# Patient Record
Sex: FEMALE | Race: WHITE | NOT HISPANIC OR LATINO | Employment: OTHER | URBAN - METROPOLITAN AREA
[De-identification: names, ages, dates, MRNs, and addresses within clinical notes are randomized per-mention and may not be internally consistent; named-entity substitution may affect disease eponyms.]

---

## 2017-01-20 ENCOUNTER — TELEPHONE (OUTPATIENT)
Dept: GASTROENTEROLOGY | Facility: CLINIC | Age: 72
End: 2017-01-20

## 2017-01-20 NOTE — TELEPHONE ENCOUNTER
S/w pt to schedule colon ordered by Dr. Riojas. Pt declined she stated she can not do that right now she does not have time. She will call back when ready.

## 2017-02-07 RX ORDER — METOPROLOL TARTRATE 50 MG/1
TABLET ORAL
Qty: 90 TABLET | Refills: 3 | Status: SHIPPED | OUTPATIENT
Start: 2017-02-07 | End: 2018-04-03 | Stop reason: SDUPTHER

## 2017-05-30 ENCOUNTER — PATIENT OUTREACH (OUTPATIENT)
Dept: ADMINISTRATIVE | Facility: HOSPITAL | Age: 72
End: 2017-05-30

## 2017-05-30 NOTE — LETTER
May 30, 2017    Sofiya aZrate  [address] [address]             [facility]  [address]  [facility] [address]  Phone: [phone] Dear Ms. Zarate:    [facility] is committed to your overall health.  To help you get the most out of each of your visits, we will review your information to make sure you are up to date on all of your recommended tests and/or procedures.      Dr. Riojas        has found that you may be due for:    One-time Hepatitis C Screening lab test(a viral condition that can harm the liver)  colonoscopy    If you have had any of the above done at another facility, please bring the records or information with you so that your record at [facility] will be complete.     If you are currently taking medication, please bring it with you to your appointment for review.    If you have any questions or concerns, please don't hesitate to call.    Sincerely,      Courtney Cassidy  Clinical Care Coordinator  [facility] [address] [place] [address]  Phone: [phone]   Fax: [phone]

## 2017-06-27 RX ORDER — LEVOTHYROXINE SODIUM 25 UG/1
TABLET ORAL
Qty: 90 TABLET | Refills: 3 | Status: SHIPPED | OUTPATIENT
Start: 2017-06-27 | End: 2018-06-05 | Stop reason: SDUPTHER

## 2017-08-25 ENCOUNTER — PATIENT OUTREACH (OUTPATIENT)
Dept: ADMINISTRATIVE | Facility: HOSPITAL | Age: 72
End: 2017-08-25

## 2017-08-25 NOTE — LETTER
August 25, 2017    Sofiya Zarate  [address] [address] [address]             [facility]  [address]  [facility] [address]  Phone: [phone] Dear Ms. Zarate:    [facility] is committed to your overall health and would like to ensure that you are up to date on your recommended health testing.   Dr. Riojas has found that you may be due for the following:    One-time Hepatitis C Screening lab test(a viral condition that can harm the liver)  colonoscopy    If you have had any of the above done at another facility, please let us know by calling or faxing to the numbers below so that your medical record can be updated. If you have a copy of these records, please fax them to the fax number below.  If not, please call [phone] so that we can get the necessary information to obtain copies from that facility.     Otherwise, please schedule these appointments at your earliest convenience by calling [phone] or going to [email or web address].        If you have any questions or concerns, please don't hesitate to call.    Sincerely,    Courtney Cassidy  Clinical Care Coordinator  [facility]  [address] [place] [address]  Phone: [phone]   Fax: [phone]

## 2017-09-08 ENCOUNTER — OFFICE VISIT (OUTPATIENT)
Dept: FAMILY MEDICINE | Facility: CLINIC | Age: 72
End: 2017-09-08
Payer: MEDICARE

## 2017-09-08 VITALS
BODY MASS INDEX: 34.06 KG/M2 | WEIGHT: 199.5 LBS | HEIGHT: 64 IN | SYSTOLIC BLOOD PRESSURE: 134 MMHG | HEART RATE: 60 BPM | DIASTOLIC BLOOD PRESSURE: 57 MMHG

## 2017-09-08 DIAGNOSIS — I77.9 BILATERAL CAROTID ARTERY DISEASE: ICD-10-CM

## 2017-09-08 DIAGNOSIS — M17.10 ARTHRITIS OF KNEE: Chronic | ICD-10-CM

## 2017-09-08 DIAGNOSIS — K21.9 GASTROESOPHAGEAL REFLUX DISEASE WITHOUT ESOPHAGITIS: Chronic | ICD-10-CM

## 2017-09-08 DIAGNOSIS — I25.10 CORONARY ARTERY DISEASE INVOLVING NATIVE CORONARY ARTERY OF NATIVE HEART WITHOUT ANGINA PECTORIS: Chronic | ICD-10-CM

## 2017-09-08 DIAGNOSIS — E66.9 OBESITY (BMI 30.0-34.9): ICD-10-CM

## 2017-09-08 DIAGNOSIS — E03.4 HYPOTHYROIDISM DUE TO ACQUIRED ATROPHY OF THYROID: Chronic | ICD-10-CM

## 2017-09-08 DIAGNOSIS — Z12.11 ENCOUNTER FOR SCREENING FOR MALIGNANT NEOPLASM OF COLON: ICD-10-CM

## 2017-09-08 DIAGNOSIS — Z00.00 ENCOUNTER FOR PREVENTIVE HEALTH EXAMINATION: Primary | ICD-10-CM

## 2017-09-08 DIAGNOSIS — I70.0 AORTIC ATHEROSCLEROSIS: ICD-10-CM

## 2017-09-08 DIAGNOSIS — I10 ESSENTIAL HYPERTENSION: Chronic | ICD-10-CM

## 2017-09-08 PROCEDURE — 99499 UNLISTED E&M SERVICE: CPT | Mod: S$GLB,,, | Performed by: NURSE PRACTITIONER

## 2017-09-08 PROCEDURE — G0439 PPPS, SUBSEQ VISIT: HCPCS | Mod: S$GLB,,, | Performed by: NURSE PRACTITIONER

## 2017-09-08 PROCEDURE — 99999 PR PBB SHADOW E&M-EST. PATIENT-LVL IV: CPT | Mod: PBBFAC,,, | Performed by: NURSE PRACTITIONER

## 2017-09-08 RX ORDER — CHOLECALCIFEROL (VITAMIN D3) 125 MCG
1 CAPSULE ORAL 2 TIMES DAILY PRN
COMMUNITY
Start: 2017-09-08

## 2017-09-08 NOTE — PATIENT INSTRUCTIONS
Counseling and Referral of Other Preventative  (Italic type indicates deductible and co-insurance are waived)    Patient Name: Sofiya Zarate  Today's Date: 9/8/2017      SERVICE LIMITATIONS RECOMMENDATION    Vaccines    · Pneumococcal (once after 65)    · Influenza (annually)    · Hepatitis B (if medium/high risk)    · Prevnar 13      Hepatitis B medium/high risk factors:       - End-stage renal disease       - Hemophiliacs who received Factor VII or         IX concentrates       - Clients of institutions for the mentally             retarded       - Persons who live in the same house as          a HepB carrier       - Homosexual men       - Illicit injectable drug abusers     Pneumococcal: Scheduled - see appointments     Influenza: N/A     Hepatitis B: N/A     Prevnar 13: Done, no repeat necessary    Mammogram (biennial age 50-74)  Annually (age 40 or over)  Last done 2016, recommend to repeat every 1  years    Pap (up to age 70 and after 70 if unknown history or abnormal study last 10 years)    N/A     The USPSTF recommends against screening for cervical cancer in women older than age 65 years who have had adequate prior screening and are not otherwise at high risk for cervical cancer.      Colorectal cancer screening (to age 75)    · Fecal occult blood test (annual)  · Flexible sigmoidoscopy (5y)  · Screening colonoscopy (10y)  · Barium enema   N/A    Diabetes self-management training (no USPSTF recommendations)  Requires referral by treating physician for patient with diabetes or renal disease. 10 hours of initial DSMT sessions of no less than 30 minutes each in a continuous 12-month period. 2 hours of follow-up DSMT in subsequent years.  N/A    Bone mass measurements (age 65 & older, biennial)  Requires diagnosis related to osteoporosis or estrogen deficiency. Biennial benefit unless patient has history of long-term glucocorticoid  Last done 2016, recommend to repeat every 3  years    Glaucoma screening (no  USPSTF recommendation)  Diabetes mellitus, family history   , age 50 or over    American, age 65 or over  Recommend follow up with eye care professional regularly    Medical nutrition therapy for diabetes or renal disease (no recommended schedule)  Requires referral by treating physician for patient with diabetes or renal disease or kidney transplant within the past 3 years.  Can be provided in same year as diabetes self-management training (DSMT), and CMS recommends medical nutrition therapy take place after DSMT. Up to 3 hours for initial year and 2 hours in subsequent years.  N/A    Cardiovascular screening blood tests (every 5 years)  · Fasting lipid panel  Order as a panel if possible  Last done 2016, recommend to repeat every 1  years    Diabetes screening tests (at least every 3 years, Medicare covers annually or at 6-month intervals for prediabetic patients)  · Fasting blood sugar (FBS) or glucose tolerance test (GTT)  Patient must be diagnosed with one of the following:       - Hypertension       - Dyslipidemia       - Obesity (BMI 30kg/m2)       - Previous elevated impaired FBS or GTT       ... or any two of the following:       - Overweight (BMI 25 but <30)       - Family history of diabetes       - Age 65 or older       - History of gestational diabetes or birth of baby weighing more than 9 pounds  Last done 2016, recommend to repeat every 3  years    HIV screening (annually for increased risk patients)  · HIV-1 and HIV-2 by EIA, or YADIRA, rapid antibody test or oral mucosa transudate  Patients must be at increased risk for HIV infection per USPSTF guidelines or pregnant. Tests covered annually for patient at increased risk or as requested by the patient. Pregnant patients may receive up to 3 tests during pregnancy.  Risks discussed, screening is not recommended    Smoking cessation counseling (up to 8 sessions per year)  Patients must be asymptomatic of tobacco-related conditions  to receive as a preventative service.  Non-smoker    Subsequent annual wellness visit  At least 12 months since last AWV  Return in one year     The following information is provided to all patients.  This information is to help you find resources for any of the problems found today that may be affecting your health:                Living healthy guide: www.[facility].[place].[facility]      Understanding Diabetes: www.diabetes.org      Eating healthy: www.cdc.gov/healthyweight      CDC home safety checklist: www.cdc.gov/steadi/patient.html      Agency on Aging: www.goea.[place].[facility]      Alcoholics anonymous (AA): www.aa.org      Physical Activity: www.katheryn.nih.gov/wi7mcvt      Tobacco use: www.quitwithusla.org

## 2017-09-14 PROBLEM — I70.0 AORTIC ATHEROSCLEROSIS: Status: ACTIVE | Noted: 2017-09-14

## 2017-09-14 NOTE — PROGRESS NOTES
"Sofiya Zarate presented for a  Medicare AWV and comprehensive Health Risk Assessment today. The following components were reviewed and updated:    · Medical history  · Family History  · Social history  · Allergies and Current Medications  · Health Risk Assessment  · Health Maintenance  · Care Team     ** See Completed Assessments for Annual Wellness Visit within the encounter summary.**       The following assessments were completed:  · Living Situation  · CAGE  · Depression Screening  · Timed Get Up and Go  · Whisper Test  · Cognitive Function Screening  · Nutrition Screening  · ADL Screening  · PAQ Screening    Vitals:    09/08/17 1255   BP: (!) 134/57   BP Location: Left arm   Patient Position: Sitting   BP Method: Medium (Automatic)   Pulse: 60   Weight: 90.5 kg (199 lb 8.3 oz)   Height: 5' 4" (1.626 m)     Body mass index is 34.25 kg/m².  Physical Exam   Constitutional: She is oriented to person, place, and time. She appears well-developed and well-nourished. No distress.   HENT:   Head: Normocephalic and atraumatic.   Eyes: No scleral icterus.   Neck: Carotid bruit is not present.   Cardiovascular: Normal rate, regular rhythm and normal heart sounds.  Exam reveals no gallop and no friction rub.    No murmur heard.  No carotid bruits appreciated     Pulmonary/Chest: Effort normal and breath sounds normal. No respiratory distress. She has no wheezes.   Musculoskeletal: She exhibits no edema.   Neurological: She is alert and oriented to person, place, and time.   Skin: Skin is warm and dry. No erythema.   Psychiatric: She has a normal mood and affect. Her behavior is normal. Judgment and thought content normal.   Vitals reviewed.        Diagnoses and health risks identified today and associated recommendations/orders:    1. Encounter for preventive health examination  Reviewed health maintenance and provided recommendations  Written rx for ppsv23 provided      2. Bilateral carotid artery disease  Continue to " monitor with carotid us  Taking statin  Followed by [facility].       3. Coronary artery disease involving native coronary artery of native heart without angina pectoris  No cp  Taking b-blocker and asa  Followed by [facility].       4. Essential hypertension  Stable.   Controlled on current medications.  Followed by [facility].       5. Arthritis of knee  Stable.     Followed by Felton Riojas MD .       6. Hypothyroidism due to acquired atrophy of thyroid  Stable.   Taking levothyroxine  Followed by Felton Riojas MD .       7. Obesity (BMI 30.0-34.9)  Stable.   Encourage healthy food choices  Followed by Felton Riojas MD .       8. Gastroesophageal reflux disease without esophagitis  Stable.     Followed by Felton Riojas MD .       9. Encounter for screening for malignant neoplasm of colon  [employer] was given to patient on 9/14/2017 11:04 AM        - Fecal Immunochemical Test (iFOBT); Future    10.  Aortic Atherosclerosis  Taking statin  Continue to monitor lipids  Followed by [facility].   CT abd 6/29/06    Provided Sofiya with a 5-10 year written screening schedule and personal prevention plan. Recommendations were developed using the USPSTF age appropriate recommendations. Education, counseling, and referrals were provided as needed. After Visit Summary printed and given to patient which includes a list of additional screenings\tests needed.    Return in about 1 year (around 9/8/2018).    Olga Ly NP

## 2017-09-18 ENCOUNTER — HOSPITAL ENCOUNTER (OUTPATIENT)
Dept: RADIOLOGY | Facility: HOSPITAL | Age: 72
Discharge: HOME | End: 2017-09-18
Attending: INTERNAL MEDICINE
Payer: MEDICARE

## 2017-09-18 DIAGNOSIS — I77.89 OTHER SPECIFIED DISORDERS OF ARTERIES AND ARTERIOLES: ICD-10-CM

## 2017-09-18 DIAGNOSIS — I77.9 CAROTID ARTERY DISEASE, UNSPECIFIED LATERALITY: ICD-10-CM

## 2017-09-18 DIAGNOSIS — I77.9 CAROTID ARTERY DISORDER: ICD-10-CM

## 2017-09-18 PROCEDURE — 93880 EXTRACRANIAL BILAT STUDY: CPT | Mod: 26,,, | Performed by: RADIOLOGY

## 2017-09-18 PROCEDURE — 93880 EXTRACRANIAL BILAT STUDY: CPT | Mod: TC,PO

## 2017-11-08 RX ORDER — ROSUVASTATIN CALCIUM 40 MG/1
TABLET, COATED ORAL
Qty: 90 TABLET | Refills: 0 | Status: SHIPPED | OUTPATIENT
Start: 2017-11-08 | End: 2018-05-07

## 2017-11-29 ENCOUNTER — OFFICE VISIT (OUTPATIENT)
Dept: FAMILY MEDICINE | Facility: CLINIC | Age: 72
End: 2017-11-29
Payer: MEDICARE

## 2017-11-29 ENCOUNTER — TELEPHONE (OUTPATIENT)
Dept: FAMILY MEDICINE | Facility: CLINIC | Age: 72
End: 2017-11-29

## 2017-11-29 ENCOUNTER — HOSPITAL ENCOUNTER (OUTPATIENT)
Dept: RADIOLOGY | Facility: HOSPITAL | Age: 72
Discharge: HOME | End: 2017-11-29
Attending: NURSE PRACTITIONER
Payer: MEDICARE

## 2017-11-29 VITALS
SYSTOLIC BLOOD PRESSURE: 120 MMHG | RESPIRATION RATE: 14 BRPM | HEART RATE: 63 BPM | OXYGEN SATURATION: 95 % | DIASTOLIC BLOOD PRESSURE: 84 MMHG | WEIGHT: 196.88 LBS | HEIGHT: 64 IN | BODY MASS INDEX: 33.61 KG/M2 | TEMPERATURE: 99 F

## 2017-11-29 DIAGNOSIS — M79.605 PAIN OF LEFT LOWER EXTREMITY: Primary | ICD-10-CM

## 2017-11-29 DIAGNOSIS — M79.605 PAIN OF LEFT LOWER EXTREMITY: ICD-10-CM

## 2017-11-29 DIAGNOSIS — M79.605 LEFT LEG PAIN: Primary | ICD-10-CM

## 2017-11-29 PROCEDURE — 73590 X-RAY EXAM OF LOWER LEG: CPT | Mod: TC,PO,LT

## 2017-11-29 PROCEDURE — 99999 PR PBB SHADOW E&M-EST. PATIENT-LVL V: CPT | Mod: PBBFAC,,, | Performed by: NURSE PRACTITIONER

## 2017-11-29 PROCEDURE — 73590 X-RAY EXAM OF LOWER LEG: CPT | Mod: 26,LT,, | Performed by: RADIOLOGY

## 2017-11-29 PROCEDURE — 99213 OFFICE O/P EST LOW 20 MIN: CPT | Mod: S$GLB,,, | Performed by: NURSE PRACTITIONER

## 2017-11-29 RX ORDER — GABAPENTIN 100 MG/1
100 CAPSULE ORAL NIGHTLY
Qty: 30 CAPSULE | Refills: 0 | Status: SHIPPED | OUTPATIENT
Start: 2017-11-29 | End: 2018-03-07

## 2017-11-29 NOTE — PROGRESS NOTES
Subjective:       Patient ID: Sofiya Zarate is a 72 y.o. female.    Chief Complaint: Hip Pain; Leg Pain; and Back Pain    Pt here for c/o pain in lower left leg for a couple of weeks now, she reports the pain has made her alter her gait which is causing her hip and back to hurt as well. Pt reports, she doesn't recall injuring her left lower leg. It hurts worse laying down and hurts when she walks. Dealing with it for 2 weeks, using ibuprofen, heat, and rubs.  Began taking gabapentin one pill at night only,this seems to help, but she ran out of gabapentin. Denies fever, warmth to site, or injury. Hx of knee replacement 13 yrs ago of left leg.      Leg Pain    The incident occurred more than 1 week ago. The incident occurred at home. There was no injury mechanism. The pain is present in the left leg. The quality of the pain is described as stabbing. The pain is at a severity of 10/10 (walking in and 8 or 9 sitting.). The pain has been constant since onset. Associated symptoms include an inability to bear weight. Pertinent negatives include no loss of sensation, numbness or tingling. She reports no foreign bodies present. The symptoms are aggravated by movement and weight bearing. She has tried NSAIDs, rest, heat and elevation (gabapentin) for the symptoms. The treatment provided mild relief.     Vitals:    11/29/17 1128   BP: 120/84   Pulse: 63   Resp: 14   Temp: 98.7 °F (37.1 °C)     Review of Systems   Constitutional: Negative for chills, diaphoresis and fever.   Respiratory: Negative for chest tightness and shortness of breath.    Cardiovascular: Negative for chest pain.   Musculoskeletal:        Left leg pain that makes her walk differently and now hurting her hip and back area.    Neurological: Negative for tingling and numbness.       Objective:      Physical Exam   Constitutional: She is oriented to person, place, and time. Vital signs are normal. She appears well-developed and well-nourished. She is cooperative.    HENT:   Head: Normocephalic and atraumatic.   Right Ear: Hearing normal.   Left Ear: Hearing normal.   Mouth/Throat: Mucous membranes are normal.   Eyes: Conjunctivae, EOM and lids are normal.   Neck: Normal range of motion. Neck supple.   Cardiovascular: Normal rate, regular rhythm and normal heart sounds.    Pulses:       Posterior tibial pulses are 2+ on the left side.   Pulmonary/Chest: Effort normal and breath sounds normal. No respiratory distress.   Musculoskeletal:        Left lower leg: She exhibits tenderness. She exhibits no swelling, no deformity and no laceration.        Legs:  Negative homans sign to left leg. No edema, erythema, or warmth to touch. No visible injury or abrasion noted. Skin intact.    Neurological: She is alert and oriented to person, place, and time.   Skin: Skin is warm and dry.   Psychiatric: She has a normal mood and affect. Her speech is normal and behavior is normal. Judgment and thought content normal. Cognition and memory are normal.   Nursing note and vitals reviewed.      Assessment & Plan:       Pain of left lower extremity  -     gabapentin (NEURONTIN) 100 MG capsule; Take 1 capsule (100 mg total) by mouth every evening.  Dispense: 30 capsule; Refill: 0  -     X-Ray Tibia Fibula 2 View Left; Future; Expected date: 11/29/2017    Will call pt with results.       Return if symptoms worsen or fail to improve.

## 2017-11-29 NOTE — TELEPHONE ENCOUNTER
"Spoke with pt on telephone. Notified of left leg xray result: "A total knee arthroplasty on the left is noted. Osseous demineralization is suspected diffusely. Achilles and plantar calcaneal spurring is noted. Talonavicular joint space loss is noted on the left."    Reviewed xray result with Dr Cason, recommended ultrasound.   Pt verbalized understanding.     "

## 2017-11-30 ENCOUNTER — TELEPHONE (OUTPATIENT)
Dept: FAMILY MEDICINE | Facility: CLINIC | Age: 72
End: 2017-11-30

## 2017-11-30 ENCOUNTER — HOSPITAL ENCOUNTER (OUTPATIENT)
Dept: RADIOLOGY | Facility: HOSPITAL | Age: 72
Discharge: HOME | End: 2017-11-30
Attending: NURSE PRACTITIONER
Payer: MEDICARE

## 2017-11-30 DIAGNOSIS — M79.605 LEFT LEG PAIN: ICD-10-CM

## 2017-11-30 DIAGNOSIS — M77.32 CALCANEAL SPUR OF LEFT FOOT: Primary | ICD-10-CM

## 2017-11-30 PROCEDURE — 93971 EXTREMITY STUDY: CPT | Mod: 26,,, | Performed by: RADIOLOGY

## 2017-11-30 PROCEDURE — 93971 EXTREMITY STUDY: CPT | Mod: TC,PO

## 2017-11-30 NOTE — TELEPHONE ENCOUNTER
"Spoke with pt on the telephone.   Notified of normal US of left leg, no blood clot seen.   Pt aware of xray result :"A total knee arthroplasty on the left is noted. Osseous demineralization is suspected diffusely. Achilles and plantar calcaneal spurring is noted. Talonavicular joint space loss is noted on the left".    Will send referral to [facility] per patient's request.   "

## 2017-12-04 ENCOUNTER — TELEPHONE (OUTPATIENT)
Dept: FAMILY MEDICINE | Facility: CLINIC | Age: 72
End: 2017-12-04

## 2017-12-04 NOTE — TELEPHONE ENCOUNTER
Junior with [facility] phoned in regards to referral for pt. Refaxed via [employer] to them and junior confirmed receipt of referral. CLC

## 2017-12-04 NOTE — TELEPHONE ENCOUNTER
----- Message from Gina Bashir sent at 12/4/2017  3:30 PM CST -----  Contact: Cami lynn/ [facility]  Cami lynn/ [facility] calling to speak with the Nurse. The patient is at their office but no orders have been sent over. Please advise. Call to pod. Call connected to pod. Warm transferred.  Thanks!

## 2017-12-15 DIAGNOSIS — Z11.59 NEED FOR HEPATITIS C SCREENING TEST: ICD-10-CM

## 2018-02-20 ENCOUNTER — OFFICE VISIT (OUTPATIENT)
Dept: FAMILY MEDICINE | Facility: CLINIC | Age: 73
End: 2018-02-20
Payer: MEDICARE

## 2018-02-20 VITALS
DIASTOLIC BLOOD PRESSURE: 70 MMHG | HEART RATE: 68 BPM | OXYGEN SATURATION: 98 % | TEMPERATURE: 99 F | WEIGHT: 196.19 LBS | BODY MASS INDEX: 33.49 KG/M2 | SYSTOLIC BLOOD PRESSURE: 110 MMHG | HEIGHT: 64 IN

## 2018-02-20 DIAGNOSIS — M79.605 LEFT LEG PAIN: Primary | ICD-10-CM

## 2018-02-20 PROCEDURE — 1125F AMNT PAIN NOTED PAIN PRSNT: CPT | Mod: S$GLB,,, | Performed by: NURSE PRACTITIONER

## 2018-02-20 PROCEDURE — 99213 OFFICE O/P EST LOW 20 MIN: CPT | Mod: S$GLB,,, | Performed by: NURSE PRACTITIONER

## 2018-02-20 PROCEDURE — 3008F BODY MASS INDEX DOCD: CPT | Mod: S$GLB,,, | Performed by: NURSE PRACTITIONER

## 2018-02-20 PROCEDURE — 1159F MED LIST DOCD IN RCRD: CPT | Mod: S$GLB,,, | Performed by: NURSE PRACTITIONER

## 2018-02-20 PROCEDURE — 99999 PR PBB SHADOW E&M-EST. PATIENT-LVL IV: CPT | Mod: PBBFAC,,, | Performed by: NURSE PRACTITIONER

## 2018-02-20 RX ORDER — DIPHENHYDRAMINE HCL 25 MG
25 CAPSULE ORAL
COMMUNITY
End: 2019-05-08

## 2018-02-20 RX ORDER — METOPROLOL TARTRATE 50 MG/1
TABLET ORAL
Qty: 90 TABLET | OUTPATIENT
Start: 2018-02-20

## 2018-02-20 RX ORDER — MV/FA/DHA/EPA/FISH OIL/SAW/GNK 400MCG-200
500 COMBINATION PACKAGE (EA) ORAL 2 TIMES DAILY
COMMUNITY
End: 2019-05-08

## 2018-02-20 RX ORDER — GARLIC 1000 MG
1000 CAPSULE ORAL DAILY
COMMUNITY
End: 2019-07-22

## 2018-02-20 NOTE — PROGRESS NOTES
"Subjective:       Patient ID: Sofiya Zarate is a 72 y.o. female.  Last seen pt on 11/29/2017.     Chief Complaint: Leg Pain (left bottom, started before Thanksgiving)  Pt reports she has been going to PT since her last visit, but pain to left leg remains. She has also tried Gabapentin.   Xray results from 11/29/2017, "A total knee arthroplasty on the left is noted. Osseous demineralization is suspected diffusely. Achilles and plantar calcaneal spurring is noted. Talonavicular joint space loss is noted on the left."  Venous US results form 11/29/2017, " No evidence of deep vein thrombosis as described above."  Leg Pain    The incident occurred more than 1 week ago. The incident occurred at home. There was no injury mechanism. The pain is present in the left leg. Associated symptoms include an inability to bear weight. The symptoms are aggravated by movement and weight bearing. Treatments tried: PT, gabapentin. The treatment provided no relief.     Vitals:    02/20/18 0925   BP: 110/70   Pulse: 68   Temp: 98.5 °F (36.9 °C)     Review of Systems   Constitutional: Negative for chills, diaphoresis and fever.   Respiratory: Negative for chest tightness and shortness of breath.    Cardiovascular: Negative for chest pain.   Musculoskeletal:        Recurrent left leg pain since Thanksgiving. Pt reports she has gone to PT with little relief.        Objective:      Physical Exam   Constitutional: She is oriented to person, place, and time. Vital signs are normal. She appears well-developed and well-nourished. She is cooperative.   HENT:   Head: Normocephalic and atraumatic.   Right Ear: Hearing normal.   Left Ear: Hearing normal.   Mouth/Throat: Mucous membranes are normal.   Eyes: Conjunctivae and EOM are normal.   Neck: Normal range of motion.   Cardiovascular: Normal rate, regular rhythm, S1 normal, S2 normal and normal heart sounds.    Pulmonary/Chest: Effort normal and breath sounds normal. No respiratory distress. "   Musculoskeletal:        Left lower leg: She exhibits tenderness. She exhibits no swelling.        Legs:  Neurological: She is alert and oriented to person, place, and time.   Skin: Skin is warm and dry. Capillary refill takes less than 2 seconds.   Psychiatric: She has a normal mood and affect. Her speech is normal and behavior is normal. Judgment and thought content normal.   Nursing note and vitals reviewed.      Assessment & Plan:       Left leg pain  -     Ambulatory referral to Orthopedics    Appointment made with Ortho [facility] on 2/21/2018 for further evaluation and treatment.      Follow-up if symptoms worsen or fail to improve.

## 2018-02-21 ENCOUNTER — OFFICE VISIT (OUTPATIENT)
Dept: ORTHOPEDICS | Facility: CLINIC | Age: 73
End: 2018-02-21
Payer: MEDICARE

## 2018-02-21 ENCOUNTER — TELEPHONE (OUTPATIENT)
Dept: CARDIOLOGY | Facility: CLINIC | Age: 73
End: 2018-02-21

## 2018-02-21 VITALS
WEIGHT: 196 LBS | HEART RATE: 70 BPM | HEIGHT: 64 IN | SYSTOLIC BLOOD PRESSURE: 169 MMHG | BODY MASS INDEX: 33.46 KG/M2 | DIASTOLIC BLOOD PRESSURE: 82 MMHG

## 2018-02-21 DIAGNOSIS — G57.92 NEURITIS OF LOWER EXTREMITY, LEFT: Primary | ICD-10-CM

## 2018-02-21 DIAGNOSIS — M89.8X6 PAIN IN LEFT TIBIA: Primary | ICD-10-CM

## 2018-02-21 PROCEDURE — 1125F AMNT PAIN NOTED PAIN PRSNT: CPT | Mod: S$GLB,,, | Performed by: ORTHOPAEDIC SURGERY

## 2018-02-21 PROCEDURE — 3008F BODY MASS INDEX DOCD: CPT | Mod: S$GLB,,, | Performed by: ORTHOPAEDIC SURGERY

## 2018-02-21 PROCEDURE — 99999 PR PBB SHADOW E&M-EST. PATIENT-LVL III: CPT | Mod: PBBFAC,,, | Performed by: ORTHOPAEDIC SURGERY

## 2018-02-21 PROCEDURE — 1159F MED LIST DOCD IN RCRD: CPT | Mod: S$GLB,,, | Performed by: ORTHOPAEDIC SURGERY

## 2018-02-21 PROCEDURE — 99203 OFFICE O/P NEW LOW 30 MIN: CPT | Mod: S$GLB,,, | Performed by: ORTHOPAEDIC SURGERY

## 2018-02-21 NOTE — TELEPHONE ENCOUNTER
----- Message from Carlyn Valdovinos sent at 2/21/2018 12:12 PM CST -----  Contact: self  Patient needs refill on metoprolol tartrate (LOPRESSOR) 50 MG tablet. Patient is no longer seeing Dr Vargas who wrote the original prescription. Please call back at [phone] (home)     [address] [address]  Phone: [phone] Fax: [phone]

## 2018-02-21 NOTE — TELEPHONE ENCOUNTER
Spoke to patient, informed her of refills was denied due to appointment is needed. Pt. Verbalized understanding. And will call to schedule an appointment.

## 2018-02-21 NOTE — LETTER
February 22, 2018      Danielle Arciniega NP  [address] [facility] [address] [place] [address]           [facility]  [address] [facility] [address] [place] [address]  Phone: [phone]          Patient: Sofiya Zarate   MR Number: [id number]   YOB: 1945   Date of Visit: 2/21/2018       Dear Danielle Arciniega:    Thank you for referring Sofiya Zarate to me for evaluation. Attached you will find relevant portions of my assessment and plan of care.    If you have questions, please do not hesitate to call me. I look forward to following Sofiya Zarate along with you.    Sincerely,    Harrison Rangel MD    Enclosure  CC:  No Recipients    If you would like to receive this communication electronically, please contact [email or web address] or [phone] to request more information on [facility] access.    For providers and/or their staff who would like to refer a patient to [facility], please contact us through our one-stop-shop provider referral line, [address], at [phone].    If you feel you have received this communication in error or would no longer like to receive these types of communications, please e-mail [email or web address]

## 2018-02-22 NOTE — PROGRESS NOTES
Past Medical History:   Diagnosis Date    Hyperlipidemia     Hypertension     Hypothyroidism        Past Surgical History:   Procedure Laterality Date    CORONARY ANGIOPLASTY WITH STENT PLACEMENT      1 stent    TOTAL KNEE ARTHROPLASTY Left     TUBAL LIGATION         Current Outpatient Prescriptions   Medication Sig    aspirin (ECOTRIN) 81 MG EC tablet Take 81 mg by mouth once daily.     b complex vitamins tablet Take 1 tablet by mouth once daily.    calcium carbonate-vitamin D3 (CALCIUM 600 + D,3,) 600-125 mg-unit Tab Take 1 tablet by mouth once daily.      CHOLECALCIFEROL, VITAMIN D3, (VITAMIN D3 ORAL) Take 5,000 mcg by mouth once daily.    diphenhydrAMINE (BENADRYL) 25 mg capsule Take 25 mg by mouth before meals and at bedtime as needed for Itching.    garlic 1,000 mg Cap Take 1,000 mg by mouth once daily.    krill oil 500 mg Cap Take 500 mg by mouth 2 (two) times daily.    levothyroxine (SYNTHROID) 25 MCG tablet TAKE ONE TABLET BY MOUTH EVERY DAY    metoprolol tartrate (LOPRESSOR) 50 MG tablet TAKE ONE-HALF TABLET BY MOUTH TWICE DAILY    naproxen sodium (ALEVE) 220 mg Cap Take by mouth daily as needed.    rosuvastatin (CRESTOR) 40 MG Tab TAKE ONE TABLET BY MOUTH EVERY DAY    gabapentin (NEURONTIN) 100 MG capsule Take 1 capsule (100 mg total) by mouth every evening.     No current facility-administered medications for this visit.        Review of patient's allergies indicates:  No Known Allergies    Family History   Problem Relation Age of Onset    Hypertension Mother     Osteoporosis Mother     Heart disease Father     Heart attack Father     Heart disease Sister     Diabetes Sister     Clotting disorder Maternal Uncle     Stroke Maternal Grandmother     Heart disease Brother     Diabetes Brother     Alzheimer's disease Maternal Aunt        Social History     Social History    Marital status:      Spouse name: N/A    Number of children: N/A    Years of education: N/A      Occupational History    Not on file.     Social History Main Topics    Smoking status: Never Smoker    Smokeless tobacco: Never Used    Alcohol use No    Drug use: No    Sexual activity: Not on file     Other Topics Concern    Not on file     Social History Narrative    No narrative on file       Chief Complaint:   Chief Complaint   Patient presents with    Leg Pain     L tib/fib       History of present illness: Is a 72-year-old female seen for left calf and leg pain.  Patient describes it as a shooting pain that radiates from the proximal lateral calf down the lateral aspect of foot.  Patient denies numbness and tingling.  Patient had a total knee arthroplasty done about 13 years ago.  Pain started in November.  There was no inciting event or trauma.  Pain with laying down at night.  Pain with any prolonged walking.  She rates the pain as a 5 out of 10.  Patient had an EMG done which showed no significant pathology of the peroneal nerve.      Review of Systems:    Constitution: Negative for chills, fever, and sweats.  Negative for unexplained weight loss.    HENT:  Negative for headaches and blurry vision.    Cardiovascular:Negative for chest pain or irregular heart beat. Negative for hypertension.    Respiratory:  Negative for cough and shortness of breath.    Gastrointestinal: Negative for abdominal pain, heartburn, melena, nausea, and vomitting.    Genitourinary:  Negative bladder incontinence and dysuria.    Musculoskeletal:  See HPI    Neurological: Negative for numbness.    Psychiatric/Behavioral: Negative for depression.  The patient is not nervous/anxious.      Endocrine: Negative for polyuria    Hematologic/Lymphatic: Negative for bleeding problem.  Does not bruise/bleed easily.    Skin: Negative for poor would healing and rash      Physical Examination:    Vital Signs:    Vitals:    02/21/18 1519   BP: (!) 169/82   Pulse: 70       Body mass index is 33.64 kg/m².    This a well-developed,  well nourished patient in no acute distress.  They are alert and oriented and cooperative to examination.  Pt. walks without an antalgic gait.      Examination of the left knee shows no rashes or erythema.  Healed midline incision.  There are no masses ecchymosis or effusion. Patient has full range of motion from 0-130°. Patient is nontender to palpation over lateral joint line and nontender to palpation over the medial joint line. Patient has a - Lachman exam, - anterior drawer exam, and - posterior drawer exam. - Elian's exam. Knee is stable to varus and valgus stress. 5 out of 5 motor strength. Palpable distal pulses. Intact light touch sensation. Negative Patellofemoral crepitus.  Mild tenderness near the fibular head.    Examination of the right knee shows no rashes or erythema. There are no masses ecchymosis or effusion. Patient has full range of motion from 0-130°. Patient is nontender to palpation over lateral joint line and nontender to palpation over the medial joint line. Patient has a - Lachman exam, - anterior drawer exam, and - posterior drawer exam. - Elian's exam. Knee is stable to varus and valgus stress. 5 out of 5 motor strength. Palpable distal pulses. Intact light touch sensation. Negative Patellofemoral crepitus    X-rays: X-rays left tibia is ordered and reviewed which show no acute pathology.  Well aligned total knee arthroplasty without complications     Assessment:: Left lower extremity neuritis    Plan:  I reviewed the findings with her today.  I recommended an MRI of her left leg to look for possible lesions of the peroneal nerve or possibly some muscle entrapment.  If this is normal, the patient might benefit from MRI of her lumbar spine to look for possible lumbar radiculopathy.    This note was created using Dragon voice recognition software that occasionally misinterpreted phrases or words.    Consult note is delivered via Epic messaging service.

## 2018-03-03 ENCOUNTER — HOSPITAL ENCOUNTER (OUTPATIENT)
Dept: RADIOLOGY | Facility: HOSPITAL | Age: 73
Discharge: HOME | End: 2018-03-03
Attending: ORTHOPAEDIC SURGERY
Payer: MEDICARE

## 2018-03-03 DIAGNOSIS — M89.8X6 PAIN IN LEFT TIBIA: ICD-10-CM

## 2018-03-03 PROCEDURE — 73718 MRI LOWER EXTREMITY W/O DYE: CPT | Mod: 26,LT,, | Performed by: RADIOLOGY

## 2018-03-03 PROCEDURE — 73718 MRI LOWER EXTREMITY W/O DYE: CPT | Mod: TC,PO,LT

## 2018-03-06 ENCOUNTER — TELEPHONE (OUTPATIENT)
Dept: ORTHOPEDICS | Facility: CLINIC | Age: 73
End: 2018-03-06

## 2018-03-06 NOTE — TELEPHONE ENCOUNTER
----- Message from Celestino SUTTON Frisard sent at 3/6/2018  3:07 PM CST -----  Contact: same  Patient called in and wanted to check the status of her MRI of her left leg, that she had done on Saturday 3/3/18 at the [facility].  Patient call back number is [phone]

## 2018-03-07 ENCOUNTER — OFFICE VISIT (OUTPATIENT)
Dept: ORTHOPEDICS | Facility: CLINIC | Age: 73
End: 2018-03-07
Payer: MEDICARE

## 2018-03-07 VITALS
SYSTOLIC BLOOD PRESSURE: 130 MMHG | WEIGHT: 196 LBS | DIASTOLIC BLOOD PRESSURE: 70 MMHG | HEIGHT: 64 IN | BODY MASS INDEX: 33.46 KG/M2 | HEART RATE: 77 BPM

## 2018-03-07 DIAGNOSIS — M54.16 LEFT LUMBAR RADICULOPATHY: Primary | ICD-10-CM

## 2018-03-07 DIAGNOSIS — M54.5 ACUTE BILATERAL LOW BACK PAIN, WITH SCIATICA PRESENCE UNSPECIFIED: Primary | ICD-10-CM

## 2018-03-07 PROCEDURE — 3078F DIAST BP <80 MM HG: CPT | Mod: S$GLB,,, | Performed by: ORTHOPAEDIC SURGERY

## 2018-03-07 PROCEDURE — 3075F SYST BP GE 130 - 139MM HG: CPT | Mod: S$GLB,,, | Performed by: ORTHOPAEDIC SURGERY

## 2018-03-07 PROCEDURE — 99213 OFFICE O/P EST LOW 20 MIN: CPT | Mod: S$GLB,,, | Performed by: ORTHOPAEDIC SURGERY

## 2018-03-07 PROCEDURE — 99999 PR PBB SHADOW E&M-EST. PATIENT-LVL III: CPT | Mod: PBBFAC,,, | Performed by: ORTHOPAEDIC SURGERY

## 2018-03-07 RX ORDER — GABAPENTIN 300 MG/1
300 CAPSULE ORAL NIGHTLY
Qty: 90 CAPSULE | Refills: 0 | Status: SHIPPED | OUTPATIENT
Start: 2018-03-07 | End: 2018-06-05

## 2018-03-07 NOTE — PROGRESS NOTES
Past Medical History:   Diagnosis Date    Hyperlipidemia     Hypertension     Hypothyroidism        Past Surgical History:   Procedure Laterality Date    CORONARY ANGIOPLASTY WITH STENT PLACEMENT      1 stent    TOTAL KNEE ARTHROPLASTY Left     TUBAL LIGATION         Current Outpatient Prescriptions   Medication Sig    aspirin (ECOTRIN) 81 MG EC tablet Take 81 mg by mouth once daily.     b complex vitamins tablet Take 1 tablet by mouth once daily.    calcium carbonate-vitamin D3 (CALCIUM 600 + D,3,) 600-125 mg-unit Tab Take 1 tablet by mouth once daily.      CHOLECALCIFEROL, VITAMIN D3, (VITAMIN D3 ORAL) Take 5,000 mcg by mouth once daily.    diphenhydrAMINE (BENADRYL) 25 mg capsule Take 25 mg by mouth before meals and at bedtime as needed for Itching.    garlic 1,000 mg Cap Take 1,000 mg by mouth once daily.    krill oil 500 mg Cap Take 500 mg by mouth 2 (two) times daily.    levothyroxine (SYNTHROID) 25 MCG tablet TAKE ONE TABLET BY MOUTH EVERY DAY    metoprolol tartrate (LOPRESSOR) 50 MG tablet TAKE ONE-HALF TABLET BY MOUTH TWICE DAILY    naproxen sodium (ALEVE) 220 mg Cap Take by mouth daily as needed.    rosuvastatin (CRESTOR) 40 MG Tab TAKE ONE TABLET BY MOUTH EVERY DAY    gabapentin (NEURONTIN) 100 MG capsule Take 1 capsule (100 mg total) by mouth every evening.     No current facility-administered medications for this visit.        Review of patient's allergies indicates:  No Known Allergies    Family History   Problem Relation Age of Onset    Hypertension Mother     Osteoporosis Mother     Heart disease Father     Heart attack Father     Heart disease Sister     Diabetes Sister     Clotting disorder Maternal Uncle     Stroke Maternal Grandmother     Heart disease Brother     Diabetes Brother     Alzheimer's disease Maternal Aunt        Social History     Social History    Marital status:      Spouse name: N/A    Number of children: N/A    Years of education: N/A      Occupational History    Not on file.     Social History Main Topics    Smoking status: Never Smoker    Smokeless tobacco: Never Used    Alcohol use No    Drug use: No    Sexual activity: Not on file     Other Topics Concern    Not on file     Social History Narrative    No narrative on file       Chief Complaint:   Chief Complaint   Patient presents with    Leg Injury     MRI Results        History of present illness: Is a 72-year-old female seen for left calf and leg pain.  Patient describes it as a shooting pain that radiates from the proximal lateral calf down the lateral aspect of foot.  Patient denies numbness and tingling.  Patient had a total knee arthroplasty done about 13 years ago.  Pain started in November.  There was no inciting event or trauma.  Pain with laying down at night.  Pain with any prolonged walking.  She rates the pain as a 5 out of 10.  Patient had an EMG done which showed no significant pathology of the peroneal nerve.  MRI was essentially normal as well.      Review of Systems:    Constitution: Negative for chills, fever, and sweats.  Negative for unexplained weight loss.    HENT:  Negative for headaches and blurry vision.    Cardiovascular:Negative for chest pain or irregular heart beat. Negative for hypertension.    Respiratory:  Negative for cough and shortness of breath.    Gastrointestinal: Negative for abdominal pain, heartburn, melena, nausea, and vomitting.    Genitourinary:  Negative bladder incontinence and dysuria.    Musculoskeletal:  See HPI    Neurological: Negative for numbness.    Psychiatric/Behavioral: Negative for depression.  The patient is not nervous/anxious.      Endocrine: Negative for polyuria    Hematologic/Lymphatic: Negative for bleeding problem.  Does not bruise/bleed easily.    Skin: Negative for poor would healing and rash      Physical Examination:    Vital Signs:    Vitals:    03/07/18 1441   BP: 130/70   Pulse: 77       Body mass index is  33.64 kg/m².    This a well-developed, well nourished patient in no acute distress.  They are alert and oriented and cooperative to examination.  Pt. walks without an antalgic gait.      Examination of the left knee shows no rashes or erythema.  Healed midline incision.  There are no masses ecchymosis or effusion. Patient has full range of motion from 0-130°. Patient is nontender to palpation over lateral joint line and nontender to palpation over the medial joint line. Patient has a - Lachman exam, - anterior drawer exam, and - posterior drawer exam. - Elian's exam. Knee is stable to varus and valgus stress. 5 out of 5 motor strength. Palpable distal pulses. Intact light touch sensation. Negative Patellofemoral crepitus.  Mild tenderness near the fibular head.      X-rays: X-rays left tibia is  reviewed which show no acute pathology.  Well aligned total knee arthroplasty without complications    MRI of the left tibia:Minimal subcutaneous edema like signal along the anterolateral, lateral, and posterolateral aspect of the mid ant and proximal left calf.  No masses or fluid collections in the area of clinical concern.  No specific imaging findings of an entrapment neuropathy affecting the left calf..     Assessment:: Left lower extremity neuritis    Plan:  I reviewed the findings with her today.  I recommended an MRI of her lumbar spine to rule out radiculopathy.    This note was created using Dragon voice recognition software that occasionally misinterpreted phrases or words.    Consult note is delivered via Epic messaging service.

## 2018-03-12 ENCOUNTER — TELEPHONE (OUTPATIENT)
Dept: ORTHOPEDICS | Facility: CLINIC | Age: 73
End: 2018-03-12

## 2018-03-12 NOTE — TELEPHONE ENCOUNTER
----- Message from Seda Easley sent at 3/12/2018 11:07 AM CDT -----  Contact: patient   Patient requesting orders so that she can have her MRI done at an outside facility. Please advise.   Call back    Thanks!

## 2018-03-12 NOTE — TELEPHONE ENCOUNTER
Spoke to patient. Patient would like to have MRI done elsewhere. Will call back with the name of the place so we can send the orders over.

## 2018-03-14 ENCOUNTER — TELEPHONE (OUTPATIENT)
Dept: ORTHOPEDICS | Facility: CLINIC | Age: 73
End: 2018-03-14

## 2018-03-14 NOTE — TELEPHONE ENCOUNTER
----- Message from An Michelle sent at 3/14/2018  9:17 AM CDT -----  Contact: Self // [phone]  Calling to schedule her MRI [facility], as they have an open [facility].  They need an order.  Their phone # [phone].

## 2018-03-22 ENCOUNTER — TELEPHONE (OUTPATIENT)
Dept: ORTHOPEDICS | Facility: CLINIC | Age: 73
End: 2018-03-22

## 2018-03-22 NOTE — TELEPHONE ENCOUNTER
----- Message from Martínez Moura sent at 3/22/2018  9:26 AM CDT -----  Contact: pt  Pt is calling for her MRI results   Call Back#[phone] or   Thanks

## 2018-03-28 ENCOUNTER — TELEPHONE (OUTPATIENT)
Dept: PAIN MEDICINE | Facility: CLINIC | Age: 73
End: 2018-03-28

## 2018-03-28 ENCOUNTER — OFFICE VISIT (OUTPATIENT)
Dept: ORTHOPEDICS | Facility: CLINIC | Age: 73
End: 2018-03-28
Payer: MEDICARE

## 2018-03-28 VITALS
HEIGHT: 64 IN | WEIGHT: 196 LBS | HEART RATE: 93 BPM | DIASTOLIC BLOOD PRESSURE: 90 MMHG | SYSTOLIC BLOOD PRESSURE: 172 MMHG | BODY MASS INDEX: 33.46 KG/M2

## 2018-03-28 DIAGNOSIS — M54.16 LUMBAR BACK PAIN WITH RADICULOPATHY AFFECTING LEFT LOWER EXTREMITY: Primary | ICD-10-CM

## 2018-03-28 PROCEDURE — 3080F DIAST BP >= 90 MM HG: CPT | Mod: CPTII,S$GLB,, | Performed by: ORTHOPAEDIC SURGERY

## 2018-03-28 PROCEDURE — 99213 OFFICE O/P EST LOW 20 MIN: CPT | Mod: S$GLB,,, | Performed by: ORTHOPAEDIC SURGERY

## 2018-03-28 PROCEDURE — 99999 PR PBB SHADOW E&M-EST. PATIENT-LVL III: CPT | Mod: PBBFAC,,, | Performed by: ORTHOPAEDIC SURGERY

## 2018-03-28 PROCEDURE — 3077F SYST BP >= 140 MM HG: CPT | Mod: CPTII,S$GLB,, | Performed by: ORTHOPAEDIC SURGERY

## 2018-03-28 NOTE — PROGRESS NOTES
Past Medical History:   Diagnosis Date    Hyperlipidemia     Hypertension     Hypothyroidism        Past Surgical History:   Procedure Laterality Date    CORONARY ANGIOPLASTY WITH STENT PLACEMENT      1 stent    TOTAL KNEE ARTHROPLASTY Left     TUBAL LIGATION         Current Outpatient Prescriptions   Medication Sig    aspirin (ECOTRIN) 81 MG EC tablet Take 81 mg by mouth once daily.     b complex vitamins tablet Take 1 tablet by mouth once daily.    calcium carbonate-vitamin D3 (CALCIUM 600 + D,3,) 600-125 mg-unit Tab Take 1 tablet by mouth once daily.      CHOLECALCIFEROL, VITAMIN D3, (VITAMIN D3 ORAL) Take 5,000 mcg by mouth once daily.    diphenhydrAMINE (BENADRYL) 25 mg capsule Take 25 mg by mouth before meals and at bedtime as needed for Itching.    gabapentin (NEURONTIN) 300 MG capsule Take 1 capsule (300 mg total) by mouth every evening.    garlic 1,000 mg Cap Take 1,000 mg by mouth once daily.    krill oil 500 mg Cap Take 500 mg by mouth 2 (two) times daily.    levothyroxine (SYNTHROID) 25 MCG tablet TAKE ONE TABLET BY MOUTH EVERY DAY    metoprolol tartrate (LOPRESSOR) 50 MG tablet TAKE ONE-HALF TABLET BY MOUTH TWICE DAILY    naproxen sodium (ALEVE) 220 mg Cap Take by mouth daily as needed.    rosuvastatin (CRESTOR) 40 MG Tab TAKE ONE TABLET BY MOUTH EVERY DAY     No current facility-administered medications for this visit.        Review of patient's allergies indicates:  No Known Allergies    Family History   Problem Relation Age of Onset    Hypertension Mother     Osteoporosis Mother     Heart disease Father     Heart attack Father     Heart disease Sister     Diabetes Sister     Clotting disorder Maternal Uncle     Stroke Maternal Grandmother     Heart disease Brother     Diabetes Brother     Alzheimer's disease Maternal Aunt        Social History     Social History    Marital status:      Spouse name: N/A    Number of children: N/A    Years of education: N/A      Occupational History    Not on file.     Social History Main Topics    Smoking status: Never Smoker    Smokeless tobacco: Never Used    Alcohol use No    Drug use: No    Sexual activity: Not on file     Other Topics Concern    Not on file     Social History Narrative    No narrative on file       Chief Complaint:   Chief Complaint   Patient presents with    Back Pain     lumbar spine mri results        History of present illness: Is a 72-year-old female seen for left calf and leg pain.  Patient describes it as a shooting pain that radiates from the proximal lateral calf down the lateral aspect of foot.  Patient denies numbness and tingling.  Patient had a total knee arthroplasty done about 13 years ago.  Pain started in November.  There was no inciting event or trauma.  Pain with laying down at night.  Pain with any prolonged walking.  She rates the pain as a 5 out of 10.  Patient had an EMG done which showed no significant pathology of the peroneal nerve.  MRI was essentially normal as well.  MRI of her lumbar spine did show some pathology.      Review of Systems:    Constitution: Negative for chills, fever, and sweats.  Negative for unexplained weight loss.    HENT:  Negative for headaches and blurry vision.    Cardiovascular:Negative for chest pain or irregular heart beat. Negative for hypertension.    Respiratory:  Negative for cough and shortness of breath.    Gastrointestinal: Negative for abdominal pain, heartburn, melena, nausea, and vomitting.    Genitourinary:  Negative bladder incontinence and dysuria.    Musculoskeletal:  See HPI    Neurological: Negative for numbness.    Psychiatric/Behavioral: Negative for depression.  The patient is not nervous/anxious.      Endocrine: Negative for polyuria    Hematologic/Lymphatic: Negative for bleeding problem.  Does not bruise/bleed easily.    Skin: Negative for poor would healing and rash      Physical Examination:    Vital Signs:    Vitals:     03/28/18 1449   BP: (!) 172/90   Pulse: 93       Body mass index is 33.64 kg/m².    This a well-developed, well nourished patient in no acute distress.  They are alert and oriented and cooperative to examination.  Pt. walks without an antalgic gait.      Examination of the left knee shows no rashes or erythema.  Healed midline incision.  There are no masses ecchymosis or effusion. Patient has full range of motion from 0-130°. Patient is nontender to palpation over lateral joint line and nontender to palpation over the medial joint line. Patient has a - Lachman exam, - anterior drawer exam, and - posterior drawer exam. - Elian's exam. Knee is stable to varus and valgus stress. 5 out of 5 motor strength. Palpable distal pulses. Intact light touch sensation. Negative Patellofemoral crepitus.  Mild tenderness near the fibular head.      X-rays: X-rays left tibia is  reviewed which show no acute pathology.  Well aligned total knee arthroplasty without complications    MRI of the left tibia:Minimal subcutaneous edema like signal along the anterolateral, lateral, and posterolateral aspect of the mid ant and proximal left calf.  No masses or fluid collections in the area of clinical concern.  No specific imaging findings of an entrapment neuropathy affecting the left calf.    MRI of the lumbar spine from outside facility: Degenerative changes throughout the lumbar spine, most significant at L4-5 and L5-S1 with associated grade 2 anterolisthesis at these levels.  There is near complete severe loss of the L5-S1 intervertebral space.  No significant spinal canal stenosis is appreciated but there is moderate multilevel foraminal narrowing or out the lumbar spine.  No obvious impingement or nerve displacement appreciated.     Assessment:: Left lower extremity neuritis possibly radiculopathy    Plan:  I reviewed the findings with her today.  I recommended consultation with Dr. Moore or Dr. Corado for possible epidural  steroid injection in the peroneal nerve distribution.  I don't see anything in her knee or leg to attribute her pain.    This note was created using Dragon voice recognition software that occasionally misinterpreted phrases or words.    Consult note is delivered via Epic messaging service.

## 2018-03-28 NOTE — TELEPHONE ENCOUNTER
----- Message from Rosanne Carver LPN sent at 3/28/2018  4:10 PM CDT -----  Pt being referred to Dr. Naylor by Dr. Rangel for lumbar spine pain. Mri done at [facility].  Please call pt to schedule appointment. Thanks!

## 2018-04-02 ENCOUNTER — TELEPHONE (OUTPATIENT)
Dept: ORTHOPEDICS | Facility: CLINIC | Age: 73
End: 2018-04-02

## 2018-04-02 NOTE — TELEPHONE ENCOUNTER
----- Message from Ana Kingsley sent at 4/2/2018 11:23 AM CDT -----  Contact: [phone]  Patient is requesting a call back from the nurse.    Please call the patient upon request at phone number [phone].

## 2018-04-03 ENCOUNTER — TELEPHONE (OUTPATIENT)
Dept: ORTHOPEDICS | Facility: CLINIC | Age: 73
End: 2018-04-03

## 2018-04-03 RX ORDER — METOPROLOL TARTRATE 50 MG/1
TABLET ORAL
Qty: 90 TABLET | Refills: 3 | Status: SHIPPED | OUTPATIENT
Start: 2018-04-03 | End: 2019-04-09 | Stop reason: SDUPTHER

## 2018-04-03 NOTE — TELEPHONE ENCOUNTER
----- Message from Lashell Lainez sent at 4/3/2018  1:18 PM CDT -----  Contact: pt [phone]  Call placed to pod. Patient called back to speak to Rosanne she missed your call from  Yesterday.

## 2018-04-03 NOTE — TELEPHONE ENCOUNTER
Called and advised patient of her appointment that is already set up with the back MD. Also advised that her MRI will be available for them to view in her chart. She verbalized understanding.

## 2018-04-25 ENCOUNTER — TELEPHONE (OUTPATIENT)
Dept: PAIN MEDICINE | Facility: CLINIC | Age: 73
End: 2018-04-25

## 2018-04-25 ENCOUNTER — OFFICE VISIT (OUTPATIENT)
Dept: PAIN MEDICINE | Facility: CLINIC | Age: 73
End: 2018-04-25
Payer: MEDICARE

## 2018-04-25 ENCOUNTER — TELEPHONE (OUTPATIENT)
Dept: FAMILY MEDICINE | Facility: CLINIC | Age: 73
End: 2018-04-25

## 2018-04-25 VITALS
DIASTOLIC BLOOD PRESSURE: 76 MMHG | SYSTOLIC BLOOD PRESSURE: 170 MMHG | HEART RATE: 62 BPM | BODY MASS INDEX: 33.93 KG/M2 | RESPIRATION RATE: 20 BRPM | HEIGHT: 64 IN | WEIGHT: 198.75 LBS

## 2018-04-25 DIAGNOSIS — M54.16 LUMBAR RADICULOPATHY: Primary | ICD-10-CM

## 2018-04-25 DIAGNOSIS — M51.36 DDD (DEGENERATIVE DISC DISEASE), LUMBAR: ICD-10-CM

## 2018-04-25 DIAGNOSIS — M47.816 LUMBAR SPONDYLOSIS: ICD-10-CM

## 2018-04-25 DIAGNOSIS — M43.10 ANTEROLISTHESIS: ICD-10-CM

## 2018-04-25 PROBLEM — M51.369 DDD (DEGENERATIVE DISC DISEASE), LUMBAR: Status: ACTIVE | Noted: 2018-04-25

## 2018-04-25 PROCEDURE — 99204 OFFICE O/P NEW MOD 45 MIN: CPT | Mod: S$GLB,,, | Performed by: PAIN MEDICINE

## 2018-04-25 PROCEDURE — 99999 PR PBB SHADOW E&M-EST. PATIENT-LVL III: CPT | Mod: PBBFAC,,, | Performed by: PAIN MEDICINE

## 2018-04-25 PROCEDURE — 3077F SYST BP >= 140 MM HG: CPT | Mod: CPTII,S$GLB,, | Performed by: PAIN MEDICINE

## 2018-04-25 PROCEDURE — 3078F DIAST BP <80 MM HG: CPT | Mod: CPTII,S$GLB,, | Performed by: PAIN MEDICINE

## 2018-04-25 RX ORDER — ALPRAZOLAM 0.5 MG/1
0.5 TABLET, ORALLY DISINTEGRATING ORAL ONCE AS NEEDED
Status: CANCELLED | OUTPATIENT
Start: 2018-05-08 | End: 2018-05-08

## 2018-04-25 RX ORDER — LIDOCAINE HYDROCHLORIDE 10 MG/ML
1 INJECTION, SOLUTION EPIDURAL; INFILTRATION; INTRACAUDAL; PERINEURAL ONCE
Status: DISCONTINUED | OUTPATIENT
Start: 2018-04-25 | End: 2018-05-07

## 2018-04-25 NOTE — LETTER
April 25, 2018      Teo Rangel, DDS  [address] [place] [id number]           [facility]  [address] [facility] [address] [place] [address]  Phone: [phone]  Fax: [phone]          Patient: Sofiya Zarate   MR Number: [id number]   YOB: 1945   Date of Visit: 4/25/2018       Dear Dr. Teo Rangel:    Thank you for referring Sofiya Zarate to me for evaluation. Attached you will find relevant portions of my assessment and plan of care.    If you have questions, please do not hesitate to call me. I look forward to following Sofiya Zarate along with you.    Sincerely,    Rosalinda Naylor Jr., MD    Enclosure  CC:  No Recipients    If you would like to receive this communication electronically, please contact [email or web address] or [phone] to request more information on [email or web address] Link access.    For providers and/or their staff who would like to refer a patient to [facility], please contact us through our one-stop-shop provider referral line, [facility], at [phone].    If you feel you have received this communication in error or would no longer like to receive these types of communications, please e-mail [email or web address]

## 2018-04-25 NOTE — PROGRESS NOTES
[facility] New Patient Evaluation    Referred by: Harrison Rangel MD  Reason for referral: M54.5 (ICD-10-CM) - Lumbar spine pain    CC:   Chief Complaint   Patient presents with    Leg Pain     left radiating down to foot    Low-back Pain     Last 3 PDI Scores 4/25/2018   Pain Disability Index (PDI) 38       HPI:   Sofiya Zarate is a 72 y.o. female who complains of low back pain and leg pain.  The pain starts at the left lateral leg just below the knee and radiates down to the ankle.  Numerous therapies including medications, physical therapy, and rest have failed to improve the pain.  She is referred to pain medicine for consideration of spine interventions as this may be a nonstandard presentation of radiculopathy.    Onset: long-standing and insidious  Current Pain Score: 6/10  Typical Range: 6-10/10  Quality: Aching and Throbbing  Radiation: down the lateral leg to the ankle  Worsened by: walking for more than 5 minutes  Improved by: rest and sitting, inversion table    Previous Therapies:  PT/OT: Yes, without benefit  HEP: Yes  Interventions: None from pain managment  Surgery: Denies spine surgery  Medications:   - NSAIDS:   - MSK Relaxants:   - TCAs:   - SNRIs:   - Topicals:   - Anticonvulsants:  - Opioids:     Current Pain Medications:  1. Gabapentin 300 QHS     Review of Systems:  Review of Systems   Constitutional: Negative for chills and fever.   HENT: Negative for nosebleeds.    Eyes: Negative for pain.   Respiratory: Negative for hemoptysis.    Cardiovascular: Negative for chest pain.   Gastrointestinal: Negative for nausea and vomiting.   Genitourinary: Negative for dysuria.   Musculoskeletal: Negative for falls.   Skin: Negative for rash.   Neurological: Negative for focal weakness.   Endo/Heme/Allergies: Does not bruise/bleed easily.   Psychiatric/Behavioral: Negative for memory loss.       History:    Current Outpatient Prescriptions:     aspirin (ECOTRIN) 81 MG EC tablet, Take  81 mg by mouth once daily. , Disp: , Rfl:     b complex vitamins tablet, Take 1 tablet by mouth once daily., Disp: , Rfl:     calcium carbonate-vitamin D3 (CALCIUM 600 + D,3,) 600-125 mg-unit Tab, Take 1 tablet by mouth once daily.  , Disp: , Rfl:     CHOLECALCIFEROL, VITAMIN D3, (VITAMIN D3 ORAL), Take 5,000 mcg by mouth once daily., Disp: , Rfl:     diphenhydrAMINE (BENADRYL) 25 mg capsule, Take 25 mg by mouth before meals and at bedtime as needed for Itching., Disp: , Rfl:     gabapentin (NEURONTIN) 300 MG capsule, Take 1 capsule (300 mg total) by mouth every evening., Disp: 90 capsule, Rfl: 0    garlic 1,000 mg Cap, Take 1,000 mg by mouth once daily., Disp: , Rfl:     krill oil 500 mg Cap, Take 500 mg by mouth 2 (two) times daily., Disp: , Rfl:     levothyroxine (SYNTHROID) 25 MCG tablet, TAKE ONE TABLET BY MOUTH EVERY DAY, Disp: 90 tablet, Rfl: 3    metoprolol tartrate (LOPRESSOR) 50 MG tablet, TAKE ONE-HALF TABLET BY MOUTH TWICE DAILY, Disp: 90 tablet, Rfl: 3    naproxen sodium (ALEVE) 220 mg Cap, Take by mouth daily as needed., Disp: , Rfl:     rosuvastatin (CRESTOR) 40 MG Tab, TAKE ONE TABLET BY MOUTH EVERY DAY, Disp: 90 tablet, Rfl: 0    Past Medical History:   Diagnosis Date    Hyperlipidemia     Hypertension     Hypothyroidism        Past Surgical History:   Procedure Laterality Date    CORONARY ANGIOPLASTY WITH STENT PLACEMENT      1 stent    TOTAL KNEE ARTHROPLASTY Left     TUBAL LIGATION         Family History   Problem Relation Age of Onset    Hypertension Mother     Osteoporosis Mother     Heart disease Father     Heart attack Father     Heart disease Sister     Diabetes Sister     Clotting disorder Maternal Uncle     Stroke Maternal Grandmother     Heart disease Brother     Diabetes Brother     Alzheimer's disease Maternal Aunt        Social History     Social History    Marital status:      Spouse name: N/A    Number of children: N/A    Years of education: N/A  "    Social History Main Topics    Smoking status: Never Smoker    Smokeless tobacco: Never Used    Alcohol use No    Drug use: No    Sexual activity: Not Asked     Other Topics Concern    None     Social History Narrative    None       Review of patient's allergies indicates:  No Known Allergies    Physical Exam:  Vitals:    04/25/18 1030   BP: (!) 170/76   Pulse: 62   Resp: 20   Weight: 90.2 kg (198 lb 11.9 oz)   Height: 5' 4" (1.626 m)   PainSc:   6   PainLoc: Back     General    Nursing note and vitals reviewed.  Constitutional: She is oriented to person, place, and time. She appears well-developed and well-nourished. No distress.   HENT:   Head: Normocephalic and atraumatic.   Nose: Nose normal.   Eyes: Conjunctivae and EOM are normal. Pupils are equal, round, and reactive to light. Right eye exhibits no discharge. Left eye exhibits no discharge. No scleral icterus.   Neck: No JVD present.   Cardiovascular: Intact distal pulses.    Pulmonary/Chest: Effort normal. No respiratory distress.   Abdominal: She exhibits no distension.   Neurological: She is alert and oriented to person, place, and time. Coordination normal.   Psychiatric: She has a normal mood and affect. Her behavior is normal. Judgment and thought content normal.               Imaging:  MRI lumbar spine 3/19/2018  The patient presents with outside imaging from an open MRI images from which are suboptimal.  My interpretation is significant for multilevel degenerative disc disease most severe at L5-S1 with near complete disc height loss.  There is also multilevel degenerative disc disease affecting L3-4 and L4-5 with posterolateral disc bulging resulting in bilateral neural foraminal stenosis (left greater than right).  There is also anterolisthesis of L5 on S1 and multilevel facet arthropathy most notably at L4-5 and L5-S1.    Labs:  BMP  Lab Results   Component Value Date     05/26/2016    K 4.0 05/26/2016     05/26/2016    CO2 25 " 05/26/2016    BUN 13 05/26/2016    CREATININE 0.8 05/26/2016    CALCIUM 9.4 05/26/2016    ANIONGAP 9 05/26/2016    ESTGFRAFRICA >60.0 05/26/2016    EGFRNONAA >60.0 05/26/2016     Lab Results   Component Value Date    ALT 18 05/26/2016    AST 25 05/26/2016    ALKPHOS 83 05/26/2016    BILITOT 0.5 05/26/2016       Assessment:  Problem List Items Addressed This Visit     Anterolisthesis    Lumbar spondylosis    DDD (degenerative disc disease), lumbar    Lumbar radiculopathy - Primary    Relevant Medications    lidocaine (PF) 10 mg/ml (1%) injection 10 mg    Other Relevant Orders    Insert peripheral IV    POCT glucose    Case Request Operating Room: DEVI-TRANSFORAMINAL L4-5, L5-S1 (Completed)          72-year-old female with radiating leg pain from the lateral aspect of the knee to the lateral aspect of the ankle.  The pain is been refractory to physical therapy and non-opioid medications.  The patient reports it as debilitating and severely impacting her ability to enjoy life and perform ADLs such as shopping for groceries and cleaning her house.  MRI of the left lower extremity (see imaging section of EPIC) shows no evidence of local nerve entrapment or other compressive etiology that may be contributing to her symptoms.  As such, I have recommended left L4-5 and L5-S1 transforaminal epidural steroid injection for diagnostic and therapeutic purposes.  The patient wishes to proceed.    : Not applicable    Treatment Plan:   Procedures: LEFT L4-5 and L5-S1 TFESI  PT/OT/HEP: None recommended at this time.  Medications: No changes recommended at this time.  Labs: reviewed and medications are appropriately dosed for current hepatorenal function.  Imaging: No additional recommended at this time.    Follow Up: RTC p nathanael Naylor Jr, MD  Interventional Pain Medicine / Anesthesiology    Disclaimer: This note was partly generated using dictation software which may occasionally result in transcription errors.

## 2018-05-01 DIAGNOSIS — M51.36 DDD (DEGENERATIVE DISC DISEASE), LUMBAR: Primary | ICD-10-CM

## 2018-05-07 ENCOUNTER — LAB VISIT (OUTPATIENT)
Dept: LAB | Facility: HOSPITAL | Age: 73
End: 2018-05-07
Attending: INTERNAL MEDICINE
Payer: MEDICARE

## 2018-05-07 ENCOUNTER — OFFICE VISIT (OUTPATIENT)
Dept: CARDIOLOGY | Facility: CLINIC | Age: 73
End: 2018-05-07
Payer: MEDICARE

## 2018-05-07 VITALS
HEIGHT: 64 IN | DIASTOLIC BLOOD PRESSURE: 81 MMHG | BODY MASS INDEX: 33.31 KG/M2 | WEIGHT: 195.13 LBS | HEART RATE: 65 BPM | SYSTOLIC BLOOD PRESSURE: 156 MMHG

## 2018-05-07 DIAGNOSIS — I10 ESSENTIAL HYPERTENSION: Chronic | ICD-10-CM

## 2018-05-07 DIAGNOSIS — I25.10 CORONARY ARTERY DISEASE INVOLVING NATIVE CORONARY ARTERY OF NATIVE HEART WITHOUT ANGINA PECTORIS: Primary | Chronic | ICD-10-CM

## 2018-05-07 DIAGNOSIS — I25.10 CORONARY ARTERY DISEASE INVOLVING NATIVE CORONARY ARTERY OF NATIVE HEART WITHOUT ANGINA PECTORIS: Chronic | ICD-10-CM

## 2018-05-07 DIAGNOSIS — I77.9 BILATERAL CAROTID ARTERY DISEASE: ICD-10-CM

## 2018-05-07 LAB
ALBUMIN SERPL BCP-MCNC: 3.7 G/DL
ALP SERPL-CCNC: 88 U/L
ALT SERPL W/O P-5'-P-CCNC: 20 U/L
ANION GAP SERPL CALC-SCNC: 9 MMOL/L
AST SERPL-CCNC: 26 U/L
BILIRUB DIRECT SERPL-MCNC: 0.2 MG/DL
BILIRUB SERPL-MCNC: 0.5 MG/DL
BNP SERPL-MCNC: 35 PG/ML
BUN SERPL-MCNC: 11 MG/DL
CALCIUM SERPL-MCNC: 9.5 MG/DL
CHLORIDE SERPL-SCNC: 105 MMOL/L
CHOLEST SERPL-MCNC: 296 MG/DL
CHOLEST/HDLC SERPL: 4.7 {RATIO}
CO2 SERPL-SCNC: 26 MMOL/L
CREAT SERPL-MCNC: 0.8 MG/DL
EST. GFR  (AFRICAN AMERICAN): >60 ML/MIN/1.73 M^2
EST. GFR  (NON AFRICAN AMERICAN): >60 ML/MIN/1.73 M^2
GLUCOSE SERPL-MCNC: 99 MG/DL
HDLC SERPL-MCNC: 63 MG/DL
HDLC SERPL: 21.3 %
LDLC SERPL CALC-MCNC: 191.2 MG/DL
NONHDLC SERPL-MCNC: 233 MG/DL
POTASSIUM SERPL-SCNC: 4.5 MMOL/L
PROT SERPL-MCNC: 7.8 G/DL
SODIUM SERPL-SCNC: 140 MMOL/L
TRIGL SERPL-MCNC: 209 MG/DL
TSH SERPL DL<=0.005 MIU/L-ACNC: 2.46 UIU/ML

## 2018-05-07 PROCEDURE — 80048 BASIC METABOLIC PNL TOTAL CA: CPT

## 2018-05-07 PROCEDURE — 84443 ASSAY THYROID STIM HORMONE: CPT

## 2018-05-07 PROCEDURE — 3077F SYST BP >= 140 MM HG: CPT | Mod: CPTII,S$GLB,, | Performed by: INTERNAL MEDICINE

## 2018-05-07 PROCEDURE — 36415 COLL VENOUS BLD VENIPUNCTURE: CPT | Mod: PO

## 2018-05-07 PROCEDURE — 3079F DIAST BP 80-89 MM HG: CPT | Mod: CPTII,S$GLB,, | Performed by: INTERNAL MEDICINE

## 2018-05-07 PROCEDURE — 99499 UNLISTED E&M SERVICE: CPT | Mod: S$PBB,,, | Performed by: INTERNAL MEDICINE

## 2018-05-07 PROCEDURE — 83880 ASSAY OF NATRIURETIC PEPTIDE: CPT

## 2018-05-07 PROCEDURE — 80061 LIPID PANEL: CPT

## 2018-05-07 PROCEDURE — 99999 PR PBB SHADOW E&M-EST. PATIENT-LVL II: CPT | Mod: PBBFAC,,, | Performed by: INTERNAL MEDICINE

## 2018-05-07 PROCEDURE — 99214 OFFICE O/P EST MOD 30 MIN: CPT | Mod: S$GLB,,, | Performed by: INTERNAL MEDICINE

## 2018-05-07 PROCEDURE — 80076 HEPATIC FUNCTION PANEL: CPT

## 2018-05-07 NOTE — PROGRESS NOTES
Subjective:    Patient ID:  Sofiya Zarate is a 72 y.o. female who presents for follow-up of cad    HPI  She comes with no complaints, no chest pain, no shortness of breath  Had angiogram last year at [facility] reported as normal  Left leg pain is main complaint  BP slightly elevated due to leg pain  Stop crestor 5 m ago due to muscle pains    Review of Systems   Constitution: Negative for decreased appetite, weakness, malaise/fatigue, weight gain and weight loss.   Cardiovascular: Negative for chest pain, dyspnea on exertion, leg swelling, palpitations and syncope.   Respiratory: Negative for cough and shortness of breath.    Gastrointestinal: Negative.    All other systems reviewed and are negative.       Objective:    Physical Exam   Constitutional: She is oriented to person, place, and time. She appears well-developed and well-nourished.   HENT:   Head: Normocephalic.   Eyes: Pupils are equal, round, and reactive to light.   Neck: Normal range of motion. Neck supple. No JVD present. Carotid bruit is not present. No thyromegaly present.   Cardiovascular: Normal rate, regular rhythm, normal heart sounds, intact distal pulses and normal pulses.  PMI is not displaced.  Exam reveals no gallop.    No murmur heard.  Pulmonary/Chest: Effort normal and breath sounds normal.   Abdominal: Soft. Normal appearance. She exhibits no mass. There is no hepatosplenomegaly. There is no tenderness.   Musculoskeletal: Normal range of motion. She exhibits no edema.   Neurological: She is alert and oriented to person, place, and time. She has normal strength and normal reflexes. No sensory deficit.   Skin: Skin is warm and intact.   Psychiatric: She has a normal mood and affect.   Nursing note and vitals reviewed.        Assessment:       1. Coronary artery disease involving native coronary artery of native heart without angina pectoris    2. Bilateral carotid artery disease    3. Essential hypertension         Plan:     Continue all  cardiac medications  Regular exercise program  Weight loss  Labs today  Call with results  9 m f/u

## 2018-05-08 ENCOUNTER — HOSPITAL ENCOUNTER (OUTPATIENT)
Facility: HOSPITAL | Age: 73
Discharge: HOME | End: 2018-05-08
Attending: PAIN MEDICINE | Admitting: PAIN MEDICINE
Payer: MEDICARE

## 2018-05-08 ENCOUNTER — SURGERY (OUTPATIENT)
Age: 73
End: 2018-05-08

## 2018-05-08 ENCOUNTER — HOSPITAL ENCOUNTER (OUTPATIENT)
Dept: RADIOLOGY | Facility: HOSPITAL | Age: 73
Discharge: HOME | End: 2018-05-08
Attending: PAIN MEDICINE | Admitting: PAIN MEDICINE
Payer: MEDICARE

## 2018-05-08 VITALS
SYSTOLIC BLOOD PRESSURE: 145 MMHG | RESPIRATION RATE: 18 BRPM | HEART RATE: 64 BPM | TEMPERATURE: 98 F | DIASTOLIC BLOOD PRESSURE: 69 MMHG | OXYGEN SATURATION: 98 %

## 2018-05-08 DIAGNOSIS — M54.16 LUMBAR RADICULOPATHY: Primary | ICD-10-CM

## 2018-05-08 DIAGNOSIS — M51.36 DDD (DEGENERATIVE DISC DISEASE), LUMBAR: ICD-10-CM

## 2018-05-08 PROCEDURE — 64483 NJX AA&/STRD TFRM EPI L/S 1: CPT | Mod: LT,,, | Performed by: PAIN MEDICINE

## 2018-05-08 PROCEDURE — 25000003 PHARM REV CODE 250: Mod: PO | Performed by: PAIN MEDICINE

## 2018-05-08 PROCEDURE — 25500020 PHARM REV CODE 255: Mod: PO | Performed by: PAIN MEDICINE

## 2018-05-08 PROCEDURE — 64484 NJX AA&/STRD TFRM EPI L/S EA: CPT | Mod: LT,,, | Performed by: PAIN MEDICINE

## 2018-05-08 PROCEDURE — 76000 FLUOROSCOPY <1 HR PHYS/QHP: CPT | Mod: TC,PO

## 2018-05-08 PROCEDURE — 64483 NJX AA&/STRD TFRM EPI L/S 1: CPT | Mod: PO | Performed by: PAIN MEDICINE

## 2018-05-08 PROCEDURE — 64484 NJX AA&/STRD TFRM EPI L/S EA: CPT | Mod: PO | Performed by: PAIN MEDICINE

## 2018-05-08 PROCEDURE — 63600175 PHARM REV CODE 636 W HCPCS: Mod: PO | Performed by: PAIN MEDICINE

## 2018-05-08 RX ORDER — ALPRAZOLAM 0.5 MG/1
0.5 TABLET, ORALLY DISINTEGRATING ORAL ONCE AS NEEDED
Status: COMPLETED | OUTPATIENT
Start: 2018-05-08 | End: 2018-05-08

## 2018-05-08 RX ORDER — LIDOCAINE HYDROCHLORIDE 10 MG/ML
INJECTION, SOLUTION EPIDURAL; INFILTRATION; INTRACAUDAL; PERINEURAL
Status: DISCONTINUED | OUTPATIENT
Start: 2018-05-08 | End: 2018-05-08 | Stop reason: HOSPADM

## 2018-05-08 RX ORDER — DEXAMETHASONE SODIUM PHOSPHATE 4 MG/ML
INJECTION, SOLUTION INTRA-ARTICULAR; INTRALESIONAL; INTRAMUSCULAR; INTRAVENOUS; SOFT TISSUE
Status: DISCONTINUED | OUTPATIENT
Start: 2018-05-08 | End: 2018-05-08 | Stop reason: HOSPADM

## 2018-05-08 RX ORDER — BUPIVACAINE HYDROCHLORIDE 2.5 MG/ML
INJECTION, SOLUTION EPIDURAL; INFILTRATION; INTRACAUDAL
Status: DISCONTINUED | OUTPATIENT
Start: 2018-05-08 | End: 2018-05-08 | Stop reason: HOSPADM

## 2018-05-08 RX ADMIN — ALPRAZOLAM 0.5 MG: 0.5 TABLET, ORALLY DISINTEGRATING ORAL at 08:05

## 2018-05-08 RX ADMIN — DEXAMETHASONE SODIUM PHOSPHATE 20 MG: 4 INJECTION, SOLUTION INTRAMUSCULAR; INTRAVENOUS at 08:05

## 2018-05-08 RX ADMIN — BUPIVACAINE HYDROCHLORIDE 1 ML: 2.5 INJECTION, SOLUTION EPIDURAL; INFILTRATION; INTRACAUDAL; PERINEURAL at 08:05

## 2018-05-08 RX ADMIN — LIDOCAINE HYDROCHLORIDE 5 ML: 10 INJECTION, SOLUTION EPIDURAL; INFILTRATION; INTRACAUDAL; PERINEURAL at 08:05

## 2018-05-08 RX ADMIN — IOHEXOL 5 ML: 300 INJECTION, SOLUTION INTRAVENOUS at 08:05

## 2018-05-08 NOTE — DISCHARGE SUMMARY
[facility]  Discharge Note  Short Stay     Admit Date: 5/8/2018    Discharge Date: 5/8/2018     Attending Physician: Rosalinda Naylor Jr, MD    Diagnoses:  Active Hospital Problems    Diagnosis  POA    *Lumbar radiculopathy [M54.16]  Yes      Resolved Hospital Problems    Diagnosis Date Resolved POA   No resolved problems to display.     Discharged Condition: Good     Hospital Course: Patient was admitted for an outpatient interventional pain management procedure and tolerated the procedure well with no complications.     Final Diagnoses: Same as principal problem.     Disposition: Home or Self Care     Follow up/Patient Instructions:    Follow-up Information     Rosalinda Naylor Jr, MD. Go in 2 weeks.    Specialty:  Pain Medicine  Why:  Post-procedural Follow Up As Scheduled, Call to make an appointment if you do not have one  Contact information:  [address] [place] [address]  [phone]                   Reconciled Medications:     Medication List      CONTINUE taking these medications    ALEVE 220 mg Cap  Generic drug:  naproxen sodium  Take by mouth daily as needed.     aspirin 81 MG EC tablet  Commonly known as:  ECOTRIN  Take 81 mg by mouth once daily.     b complex vitamins tablet  Take 1 tablet by mouth once daily.     BENADRYL 25 mg capsule  Generic drug:  diphenhydrAMINE  Take 25 mg by mouth before meals and at bedtime as needed for Itching.     CALCIUM 600 + D(3) 600-125 mg-unit Tab  Generic drug:  calcium carbonate-vitamin D3  Take 1 tablet by mouth once daily.     gabapentin 300 MG capsule  Commonly known as:  NEURONTIN  Take 1 capsule (300 mg total) by mouth every evening.     garlic 1,000 mg Cap  Take 1,000 mg by mouth once daily.     krill oil 500 mg Cap  Take 500 mg by mouth 2 (two) times daily.     levothyroxine 25 MCG tablet  Commonly known as:  SYNTHROID  TAKE ONE TABLET BY MOUTH EVERY DAY     metoprolol tartrate 50 MG tablet  Commonly known as:  LOPRESSOR  TAKE ONE-HALF  TABLET BY MOUTH TWICE DAILY     VITAMIN D3 ORAL  Take 5,000 mcg by mouth once daily.            Discharge Procedure Orders (must include Diet, Follow-up, Activity)  Call MD for:  temperature >100.4     Call MD for:  severe uncontrolled pain     Call MD for:  redness, tenderness, or signs of infection (pain, swelling, redness, odor or green/yellow discharge around incision site)     Call MD for:  difficulty breathing or increased cough     Call MD for:  severe persistent headache     Call MD for:  worsening rash     Remove dressing in 24 hours

## 2018-05-08 NOTE — OP NOTE
"Procedure Note    Pre-operative Diagnosis: Lumbar radiculopathy  Post-operative Diagnosis: Lumbar radiculopathy  Procedure: (1) Lumbar Transforaminal Epidural Steroid Injection and (2) Intraoperative fluoroscopy  Procedure Date: 05/08/2018        Anesthesia: Local and Oral Sedation    Indications: To alleviate pain and suffering, and reduce functional impairment associated with Lumbar radiculopathy.      The patients history and physical exam were reviewed. The risks, benefits and alternatives to the procedure were discussed, and all questions were answered to the patients satisfaction. The patient agreed to proceed, and written informed consent was verified.    Procedure in Detail: Using a fenestrated drape and Chloro-prep, the skin was prepared and draped in sterile fashion. The Left L4-5 and L5-S1 neural foramena were identified by fluoroscopy by Left lateral oblique angle. Lidocaine 1% 3 cc was used to anesthetize the skin at the skin entry point and subcutaneous tissue with 25G 1 1/2 in needle. Then a 25G 3.5" spinal needle was advanced under intermittent fluoroscopy until Kambin's triangle was entered anterolateral to the superior articular process. Fluoroscopy was then inspected from lateral and AP view and needle adjusted appropriately. There was no paresthesia with needle placement. Aspiration was negative for blood and CSF. Omnipaque contrast was injected live at each level in an AP fluoroscopic view demonstrating appropriate position with spread into the nerve root sheath and medially into the epidural space without intravascular or intrathecal spread. Next,a mixture 1 mL 0.25% bupivacaine and 20 mg dexamethasone (total 3 mL) was divided evenly between the levels and injected slowly and incrementally. The patient tolerated the procedure without complaint and was transported to the recovery room in stable condition.     Disposition: The patient tolerated the procedure well, and there were no apparent " complications. Vital signs remained stable throughout the procedure. The patient was taken to the recovery area where written discharge instructions for the procedure were given.     Follow-up: RTC as scheduled      Rosalinda Naylor Jr, MD  Interventional Pain Medicine / Anesthesiology

## 2018-05-08 NOTE — DISCHARGE INSTRUCTIONS
Recovery After Procedural Sedation (Adult)  You have been given medicine by vein to make you sleep during your surgery. This may have included both a pain medicine and sleeping medicine. Most of the effects have worn off. But you may still have some drowsiness for the next 6 to 8 hours.  Home care  Follow these guidelines when you get home:  · For the next 8 hours, you should be watched by a responsible adult. This person should make sure your condition is not getting worse.  · Don't drink any alcohol for the next 24 hours.  · Don't drive, operate dangerous machinery, or make important business or personal decisions during the next 24 hours.  Note: Your healthcare provider may tell you not to take any medicine by mouth for pain or sleep in the next 4 hours. These medicines may react with the medicines you were given in the hospital. This could cause a much stronger response than usual.  Follow-up care  Follow up with your healthcare provider if you are not alert and back to your usual level of activity within 12 hours.  When to seek medical advice  Call your healthcare provider right away if any of these occur:  · Drowsiness gets worse  · Weakness or dizziness gets worse  · Repeated vomiting  · You can't be awakened   Date Last Reviewed: 10/18/2016  © 7219-1763 [employer]. [address] [place] [address], [place] [address]. All rights reserved. This information is not intended as a substitute for professional medical care. Always follow your healthcare professional's instructions.      Home care instructions  Apply ice pack to the injection site for 20 minutes periods for the first 24 hrs for soreness/discomfort at injection site DO NOT USE HEAT FOR 24 HOURS  Keep site clean and dry for 24 hours, remove bandaid when desired  Do not drive until tomorrow  Take care when walking after a lumbar injection  Avoid strenuous activities for 2 days  Make take 2 weeks to feel the full effects   Resume home medication  as prescribed today  Resume Aspirin, Plavix, or Coumadin the day after the procedure unless otherwise instructed.    SEE IMMEDIATE MEDICAL HELP FOR:  Severe increase in your usual pain or appearance of new pain  Prolonged or increasing weakness or numbness in the legs or arms  Drainage, redness, active bleeding, or increased swelling at the injection site  Temperature over 100.0 degrees F.  Headache that increases when your head is upright and decreases when you lie flat    CALL 911 OR GO DIRECTLY TO EMERGENCY DEPARTMENT FOR:  Shortness of breath, chest pain, or problems breathing

## 2018-05-29 NOTE — PROGRESS NOTES
[facility] Established Patient Evaluation    Referred by: Harrison Rangel MD  Reason for referral: M54.5 (ICD-10-CM) - Lumbar spine pain    CC:   Chief Complaint   Patient presents with    Low-back Pain     improved     Last 3 PDI Scores 5/30/2018 4/25/2018   Pain Disability Index (PDI) 8 38       Interval Update:  05/30/2018 - Patient returns for follow up s/p LEFT L4-5 and L5-S1 TFESI on 5/1/18 reporting 80-90% improvement in pain.  Sleep is significantly better and she is able to lie on the left side longer than before.  She is walking a lot farther without pain as well.  She is pleased with the progress.    Background:   Sofiya Zarate is a 73 y.o. female who complains of low back pain and leg pain.  The pain starts at the left lateral leg just below the knee and radiates down to the ankle.  Numerous therapies including medications, physical therapy, and rest have failed to improve the pain.  She is referred to pain medicine for consideration of spine interventions as this may be a nonstandard presentation of radiculopathy.    Onset: long-standing and insidious  Current Pain Score: 6/10  Typical Range: 6-10/10  Quality: Aching and Throbbing  Radiation: down the lateral leg to the ankle  Worsened by: walking for more than 5 minutes  Improved by: rest and sitting, inversion table    Previous Therapies:  PT/OT: Yes, without benefit  HEP: Yes  Interventions: None from pain managment  Surgery: Denies spine surgery  Medications:   - NSAIDS:   - MSK Relaxants:   - TCAs:   - SNRIs:   - Topicals:   - Anticonvulsants:  - Opioids:     Current Pain Medications:  1. Gabapentin 300 QHS     Review of Systems:  Review of Systems   Constitutional: Negative for chills and fever.   HENT: Negative for nosebleeds.    Eyes: Negative for pain.   Respiratory: Negative for hemoptysis.    Cardiovascular: Negative for chest pain.   Gastrointestinal: Negative for nausea and vomiting.   Genitourinary: Negative for dysuria.    Musculoskeletal: Negative for falls.   Skin: Negative for rash.   Neurological: Negative for focal weakness.   Endo/Heme/Allergies: Does not bruise/bleed easily.   Psychiatric/Behavioral: Negative for memory loss.       History:    Current Outpatient Prescriptions:     aspirin (ECOTRIN) 81 MG EC tablet, Take 81 mg by mouth once daily. , Disp: , Rfl:     b complex vitamins tablet, Take 1 tablet by mouth once daily., Disp: , Rfl:     calcium carbonate-vitamin D3 (CALCIUM 600 + D,3,) 600-125 mg-unit Tab, Take 1 tablet by mouth once daily.  , Disp: , Rfl:     CHOLECALCIFEROL, VITAMIN D3, (VITAMIN D3 ORAL), Take 5,000 mcg by mouth once daily., Disp: , Rfl:     diphenhydrAMINE (BENADRYL) 25 mg capsule, Take 25 mg by mouth before meals and at bedtime as needed for Itching., Disp: , Rfl:     gabapentin (NEURONTIN) 300 MG capsule, Take 1 capsule (300 mg total) by mouth every evening., Disp: 90 capsule, Rfl: 0    garlic 1,000 mg Cap, Take 1,000 mg by mouth once daily., Disp: , Rfl:     krill oil 500 mg Cap, Take 500 mg by mouth 2 (two) times daily., Disp: , Rfl:     levothyroxine (SYNTHROID) 25 MCG tablet, TAKE ONE TABLET BY MOUTH EVERY DAY, Disp: 90 tablet, Rfl: 3    metoprolol tartrate (LOPRESSOR) 50 MG tablet, TAKE ONE-HALF TABLET BY MOUTH TWICE DAILY, Disp: 90 tablet, Rfl: 3    naproxen sodium (ALEVE) 220 mg Cap, Take by mouth daily as needed., Disp: , Rfl:     Past Medical History:   Diagnosis Date    Encounter for blood transfusion     Hyperlipidemia     Hypertension     Hypothyroidism        Past Surgical History:   Procedure Laterality Date    CORONARY ANGIOPLASTY WITH STENT PLACEMENT      1 stent    TOTAL KNEE ARTHROPLASTY Left     TUBAL LIGATION         Family History   Problem Relation Age of Onset    Hypertension Mother     Osteoporosis Mother     Heart disease Father     Heart attack Father     Heart disease Sister     Diabetes Sister     Clotting disorder Maternal Uncle     Stroke  "Maternal Grandmother     Heart disease Brother     Diabetes Brother     Alzheimer's disease Maternal Aunt        Social History     Social History    Marital status:      Spouse name: N/A    Number of children: N/A    Years of education: N/A     Social History Main Topics    Smoking status: Never Smoker    Smokeless tobacco: Never Used    Alcohol use No    Drug use: No    Sexual activity: Not Asked     Other Topics Concern    None     Social History Narrative    None       Review of patient's allergies indicates:  No Known Allergies    Physical Exam:  Vitals:    05/30/18 0947   BP: (!) 147/74   Pulse: 68   Resp: 20   Weight: 89.8 kg (197 lb 15.6 oz)   Height: 5' 4" (1.626 m)   PainSc:   2   PainLoc: Back     General    Nursing note and vitals reviewed.  Constitutional: She is oriented to person, place, and time. She appears well-developed and well-nourished. No distress.   HENT:   Head: Normocephalic and atraumatic.   Nose: Nose normal.   Eyes: Conjunctivae and EOM are normal. Pupils are equal, round, and reactive to light. Right eye exhibits no discharge. Left eye exhibits no discharge. No scleral icterus.   Neck: No JVD present.   Cardiovascular: Intact distal pulses.    Pulmonary/Chest: Effort normal. No respiratory distress.   Abdominal: She exhibits no distension.   Neurological: She is alert and oriented to person, place, and time. Coordination normal.   Psychiatric: She has a normal mood and affect. Her behavior is normal. Judgment and thought content normal.               Imaging:  MRI lumbar spine 3/19/2018  The patient presents with outside imaging from an open MRI images from which are suboptimal.  My interpretation is significant for multilevel degenerative disc disease most severe at L5-S1 with near complete disc height loss.  There is also multilevel degenerative disc disease affecting L3-4 and L4-5 with posterolateral disc bulging resulting in bilateral neural foraminal stenosis " (left greater than right).  There is also anterolisthesis of L5 on S1 and multilevel facet arthropathy most notably at L4-5 and L5-S1.    Labs:  BMP  Lab Results   Component Value Date     05/07/2018    K 4.5 05/07/2018     05/07/2018    CO2 26 05/07/2018    BUN 11 05/07/2018    CREATININE 0.8 05/07/2018    CALCIUM 9.5 05/07/2018    ANIONGAP 9 05/07/2018    ESTGFRAFRICA >60.0 05/07/2018    EGFRNONAA >60.0 05/07/2018     Lab Results   Component Value Date    ALT 20 05/07/2018    AST 26 05/07/2018    ALKPHOS 88 05/07/2018    BILITOT 0.5 05/07/2018       Assessment:  Problem List Items Addressed This Visit     Obesity (BMI 30.0-34.9)    Lumbar spondylosis    DDD (degenerative disc disease), lumbar    Lumbar radiculopathy - Primary          72-year-old female with radiating leg pain from the lateral aspect of the knee to the lateral aspect of the ankle.  The pain is been refractory to physical therapy and non-opioid medications.  The patient reports it as debilitating and severely impacting her ability to enjoy life and perform ADLs such as shopping for groceries and cleaning her house.  MRI of the left lower extremity (see imaging section of EPIC) shows no evidence of local nerve entrapment or other compressive etiology that may be contributing to her symptoms.  As such, I have recommended left L4-5 and L5-S1 transforaminal epidural steroid injection for diagnostic and therapeutic purposes.  The patient wishes to proceed.     5/29/18 - excellent response to DEVI and patient is very pleased    : Not applicable    Treatment Plan:   Procedures: We can repeat the LEFT L4-5 and L5-S1 TFESI in the future up to 4 times per year.  PT/OT/HEP: I encouraged the patient to start a home exercise regimen that includes daily, moderate cardiovascular exercise lasting at least 30 minutes.  This may include yoga, eleuterio chi, walking, swimming, aqua aerobics, or other exercises that maintain a heart rate of 50-70% of the  calculated maximum heart rate.  I also encouraged light, daily stretching focused on the target area.  Medications: No changes recommended at this time.  Labs: reviewed and medications are appropriately dosed for current hepatorenal function.  Imaging: No additional recommended at this time.    Follow Up: RTC PRN - She may call to schedule the procedure prn or to RTC if new symptoms arise    Rosalinda Naylor Jr, MD  Interventional Pain Medicine / Anesthesiology    Disclaimer: This note was partly generated using dictation software which may occasionally result in transcription errors.

## 2018-05-30 ENCOUNTER — OFFICE VISIT (OUTPATIENT)
Dept: PAIN MEDICINE | Facility: CLINIC | Age: 73
End: 2018-05-30
Payer: MEDICARE

## 2018-05-30 VITALS
HEIGHT: 64 IN | SYSTOLIC BLOOD PRESSURE: 147 MMHG | DIASTOLIC BLOOD PRESSURE: 74 MMHG | BODY MASS INDEX: 33.8 KG/M2 | RESPIRATION RATE: 20 BRPM | HEART RATE: 68 BPM | WEIGHT: 198 LBS

## 2018-05-30 DIAGNOSIS — E66.9 OBESITY (BMI 30.0-34.9): ICD-10-CM

## 2018-05-30 DIAGNOSIS — M54.16 LUMBAR RADICULOPATHY: Primary | ICD-10-CM

## 2018-05-30 DIAGNOSIS — M51.36 DDD (DEGENERATIVE DISC DISEASE), LUMBAR: ICD-10-CM

## 2018-05-30 DIAGNOSIS — M47.816 LUMBAR SPONDYLOSIS: ICD-10-CM

## 2018-05-30 PROCEDURE — 3077F SYST BP >= 140 MM HG: CPT | Mod: CPTII,S$GLB,, | Performed by: PAIN MEDICINE

## 2018-05-30 PROCEDURE — 99213 OFFICE O/P EST LOW 20 MIN: CPT | Mod: S$GLB,,, | Performed by: PAIN MEDICINE

## 2018-05-30 PROCEDURE — 99999 PR PBB SHADOW E&M-EST. PATIENT-LVL III: CPT | Mod: PBBFAC,,, | Performed by: PAIN MEDICINE

## 2018-05-30 PROCEDURE — 3078F DIAST BP <80 MM HG: CPT | Mod: CPTII,S$GLB,, | Performed by: PAIN MEDICINE

## 2018-06-05 ENCOUNTER — LAB VISIT (OUTPATIENT)
Dept: LAB | Facility: HOSPITAL | Age: 73
End: 2018-06-05
Attending: NURSE PRACTITIONER
Payer: MEDICARE

## 2018-06-05 ENCOUNTER — OFFICE VISIT (OUTPATIENT)
Dept: FAMILY MEDICINE | Facility: CLINIC | Age: 73
End: 2018-06-05
Payer: MEDICARE

## 2018-06-05 ENCOUNTER — TELEPHONE (OUTPATIENT)
Dept: FAMILY MEDICINE | Facility: CLINIC | Age: 73
End: 2018-06-05

## 2018-06-05 VITALS
SYSTOLIC BLOOD PRESSURE: 130 MMHG | BODY MASS INDEX: 33.49 KG/M2 | HEIGHT: 64 IN | WEIGHT: 196.19 LBS | OXYGEN SATURATION: 98 % | HEART RATE: 65 BPM | DIASTOLIC BLOOD PRESSURE: 74 MMHG

## 2018-06-05 DIAGNOSIS — I77.9 BILATERAL CAROTID ARTERY DISEASE: ICD-10-CM

## 2018-06-05 DIAGNOSIS — I25.10 CORONARY ARTERY DISEASE INVOLVING NATIVE CORONARY ARTERY OF NATIVE HEART WITHOUT ANGINA PECTORIS: Chronic | ICD-10-CM

## 2018-06-05 DIAGNOSIS — E78.5 HYPERLIPIDEMIA, UNSPECIFIED HYPERLIPIDEMIA TYPE: ICD-10-CM

## 2018-06-05 DIAGNOSIS — E03.9 HYPOTHYROIDISM, UNSPECIFIED TYPE: ICD-10-CM

## 2018-06-05 DIAGNOSIS — E66.9 OBESITY (BMI 30.0-34.9): ICD-10-CM

## 2018-06-05 DIAGNOSIS — M51.36 DDD (DEGENERATIVE DISC DISEASE), LUMBAR: ICD-10-CM

## 2018-06-05 DIAGNOSIS — Z00.00 ENCOUNTER FOR PREVENTIVE HEALTH EXAMINATION: ICD-10-CM

## 2018-06-05 DIAGNOSIS — K21.9 GASTROESOPHAGEAL REFLUX DISEASE WITHOUT ESOPHAGITIS: Chronic | ICD-10-CM

## 2018-06-05 DIAGNOSIS — M54.16 LUMBAR RADICULOPATHY: ICD-10-CM

## 2018-06-05 DIAGNOSIS — Z12.39 SCREENING FOR BREAST CANCER: ICD-10-CM

## 2018-06-05 DIAGNOSIS — M47.816 LUMBAR SPONDYLOSIS: ICD-10-CM

## 2018-06-05 DIAGNOSIS — Z00.00 ENCOUNTER FOR PREVENTIVE HEALTH EXAMINATION: Primary | ICD-10-CM

## 2018-06-05 DIAGNOSIS — I70.0 AORTIC ATHEROSCLEROSIS: ICD-10-CM

## 2018-06-05 DIAGNOSIS — I10 ESSENTIAL HYPERTENSION: Chronic | ICD-10-CM

## 2018-06-05 PROBLEM — Z12.11 ENCOUNTER FOR SCREENING FOR MALIGNANT NEOPLASM OF COLON: Status: RESOLVED | Noted: 2017-09-08 | Resolved: 2018-06-05

## 2018-06-05 LAB — HCV AB SERPL QL IA: NEGATIVE

## 2018-06-05 PROCEDURE — 99499 UNLISTED E&M SERVICE: CPT | Mod: S$PBB,,, | Performed by: NURSE PRACTITIONER

## 2018-06-05 PROCEDURE — 36415 COLL VENOUS BLD VENIPUNCTURE: CPT | Mod: PO

## 2018-06-05 PROCEDURE — 3078F DIAST BP <80 MM HG: CPT | Mod: CPTII,S$GLB,, | Performed by: NURSE PRACTITIONER

## 2018-06-05 PROCEDURE — 99999 PR PBB SHADOW E&M-EST. PATIENT-LVL V: CPT | Mod: PBBFAC,,, | Performed by: NURSE PRACTITIONER

## 2018-06-05 PROCEDURE — 3075F SYST BP GE 130 - 139MM HG: CPT | Mod: CPTII,S$GLB,, | Performed by: NURSE PRACTITIONER

## 2018-06-05 PROCEDURE — G0439 PPPS, SUBSEQ VISIT: HCPCS | Mod: S$GLB,,, | Performed by: NURSE PRACTITIONER

## 2018-06-05 PROCEDURE — 86803 HEPATITIS C AB TEST: CPT

## 2018-06-05 RX ORDER — LEVOTHYROXINE SODIUM 25 UG/1
25 TABLET ORAL DAILY
Qty: 90 TABLET | Refills: 3 | Status: SHIPPED | OUTPATIENT
Start: 2018-06-05 | End: 2019-06-14 | Stop reason: SDUPTHER

## 2018-06-05 NOTE — TELEPHONE ENCOUNTER
Patient stopped crestor 3 months ago due to muscle aches and cramps. Repeat blood work last month shows an Increase in total and LDL cholesterol. Patient never received results and wants to know if there are any other treatment options or recommendations. Suggestions?   Thanks, eren

## 2018-06-05 NOTE — PROGRESS NOTES
"Sofiya Zarate presented for a  Medicare AWV and comprehensive Health Risk Assessment today. The following components were reviewed and updated:    · Medical history  · Family History  · Social history  · Allergies and Current Medications  · Health Risk Assessment  · Health Maintenance  · Care Team     Review of Systems   Constitutional: Negative for chills, fever and malaise/fatigue.   Respiratory: Negative for cough, shortness of breath and wheezing.    Cardiovascular: Negative for chest pain and leg swelling.   Gastrointestinal: Negative for abdominal pain, blood in stool, constipation, diarrhea, nausea and vomiting.   Skin: Negative for rash.   Neurological: Negative for dizziness, weakness and headaches.       ** See Completed Assessments for Annual Wellness Visit within the encounter summary.**     The following assessments were completed:  · Living Situation  · CAGE  · Depression Screening  · Timed Get Up and Go  · Whisper Test  · Cognitive Function Screening      · Nutrition Screening  · ADL Screening  · PAQ Screening    Vitals:    06/05/18 0755   BP: 130/74   BP Location: Left arm   Patient Position: Sitting   BP Method: Medium (Manual)   Pulse: 65   SpO2: 98%   Weight: 89 kg (196 lb 3.4 oz)   Height: 5' 4" (1.626 m)     Body mass index is 33.68 kg/m².  Physical Exam   Constitutional: She is oriented to person, place, and time. She appears well-nourished.   Cardiovascular: Normal rate, regular rhythm, normal heart sounds and intact distal pulses.    Pulmonary/Chest: Effort normal and breath sounds normal. She has no wheezes. She has no rales.   Neurological: She is alert and oriented to person, place, and time.   Skin: Skin is warm and dry. No rash noted.   Vitals reviewed.        Diagnoses and health risks identified today and associated recommendations/orders:    1. Encounter for preventive health examination  Reviewed and discussed health maintenance.    - Hepatitis C antibody; Future  - Mammo Digital " Screening Bilateral With CAD; Future    2. Essential hypertension  Stable- continue current treatment and follow up routinely with PCP and cardiology (Dr. Arreaga)    3. Coronary artery disease involving native coronary artery of native heart without angina pectoris  Stable- continue current treatment and follow up routinely with PCP  and cardiology (Dr. Arreaga)    4. Aortic atherosclerosis  Stable- continue current treatment and follow up routinely with PCP  and cardiology (Dr. Arreaga)    5. Bilateral carotid artery disease  Stable- continue current treatment and follow up routinely with PCP  and cardiology (Dr. Arreaga)    6. Obesity (BMI 30.0-34.9)  Encouraged healthy eating, weight loss and routine exercise     7. Hypothyroidism due to acquired atrophy of thyroid  Stable- continue current treatment and follow up routinely with PCP   - levothyroxine (SYNTHROID) 25 MCG tablet; Take 1 tablet (25 mcg total) by mouth once daily.  Dispense: 90 tablet; Refill: 3    8. Gastroesophageal reflux disease without esophagitis  Stable- continue current treatment and follow up routinely with PCP     9. Lumbar radiculopathy  Stable- continue current treatment and follow up routinely with PCP and pain management     10. DDD (degenerative disc disease), lumbar  Stable- continue current treatment and follow up routinely with PCP and pain management     11. Lumbar spondylosis  Stable- continue current treatment and follow up routinely with PCP and pain management     12. Screening for breast cancer  - Mammo Digital Screening Bilateral With CAD; Future    13. Hyperlipidemia, unspecified hyperlipidemia type  Stopped crestor due to muscle aches. Repeat labs a month ago revealed elevated total and LDL cholesterol  Will follow up with cardiology for treatment options- message sent to     I offered to discuss end of life issues, including information on how to make advance directives that the patient could use to  name someone who would make medical decisions on their behalf if they became too ill to make themselves.    ___Patient declined  _X_Patient is interested, I provided paper work and offered to discuss.    Provided Sofiya with a 5-10 year written screening schedule and personal prevention plan. Recommendations were developed using the USPSTF age appropriate recommendations. Education, counseling, and referrals were provided as needed. After Visit Summary printed and given to patient which includes a list of additional screenings\tests needed.    Valerie Faulkner NP

## 2018-06-05 NOTE — PATIENT INSTRUCTIONS
Counseling and Referral of Other Preventative  (Italic type indicates deductible and co-insurance are waived)    Patient Name: Sofiya Zarate  Today's Date: 6/5/2018    Health Maintenance       Date Due Completion Date    Hepatitis C Screening 1945 ---    High Dose Statin 05/10/1966 ---    Mammogram 10/24/2017 10/24/2016    Influenza Vaccine 08/01/2018 10/7/2016    Fecal Occult Blood Test (FOBT)/[employer] 09/18/2018 9/18/2017    DEXA SCAN 10/27/2019 10/27/2016    TETANUS VACCINE 08/19/2020 8/19/2010    Lipid Panel 05/07/2023 5/7/2018        No orders of the defined types were placed in this encounter.    The following information is provided to all patients.  This information is to help you find resources for any of the problems found today that may be affecting your health:                Living healthy guide: www.[facility].[place].gov      Understanding Diabetes: www.diabetes.org      Eating healthy: www.cdc.gov/healthyweight      [facility] home safety checklist: www.cdc.gov/steadi/patient.html      Agency on Aging: www.goea.[place].[facility]      Alcoholics anonymous (AA): www.aa.org      Physical Activity: www.katheryn.nih.gov/cz4ufqw      Tobacco use: www.quitwithusla.org

## 2018-06-08 NOTE — TELEPHONE ENCOUNTER
Please let patient know Dr. Arreaga suggested trying another statin (Lipitor). Let me know if she is willing to try this and I will send to pharmacy. Thanks

## 2018-06-12 ENCOUNTER — HOSPITAL ENCOUNTER (OUTPATIENT)
Dept: RADIOLOGY | Facility: HOSPITAL | Age: 73
Discharge: HOME | End: 2018-06-12
Attending: NURSE PRACTITIONER
Payer: MEDICARE

## 2018-06-12 DIAGNOSIS — Z12.39 SCREENING FOR BREAST CANCER: ICD-10-CM

## 2018-06-12 DIAGNOSIS — Z00.00 ENCOUNTER FOR PREVENTIVE HEALTH EXAMINATION: ICD-10-CM

## 2018-06-12 PROCEDURE — 77067 SCR MAMMO BI INCL CAD: CPT | Mod: TC,PO

## 2018-06-12 PROCEDURE — 77063 BREAST TOMOSYNTHESIS BI: CPT | Mod: 26,,, | Performed by: RADIOLOGY

## 2018-06-12 PROCEDURE — 77067 SCR MAMMO BI INCL CAD: CPT | Mod: 26,,, | Performed by: RADIOLOGY

## 2019-02-13 ENCOUNTER — PES CALL (OUTPATIENT)
Dept: ADMINISTRATIVE | Facility: CLINIC | Age: 74
End: 2019-02-13

## 2019-03-07 ENCOUNTER — TELEPHONE (OUTPATIENT)
Dept: FAMILY MEDICINE | Facility: CLINIC | Age: 74
End: 2019-03-07

## 2019-04-01 ENCOUNTER — PES CALL (OUTPATIENT)
Dept: ADMINISTRATIVE | Facility: CLINIC | Age: 74
End: 2019-04-01

## 2019-04-09 RX ORDER — METOPROLOL TARTRATE 50 MG/1
TABLET ORAL
Qty: 90 TABLET | Refills: 3 | Status: SHIPPED | OUTPATIENT
Start: 2019-04-09 | End: 2020-04-13

## 2019-05-20 ENCOUNTER — PES CALL (OUTPATIENT)
Dept: ADMINISTRATIVE | Facility: CLINIC | Age: 74
End: 2019-05-20

## 2019-06-14 DIAGNOSIS — E03.9 HYPOTHYROIDISM, UNSPECIFIED TYPE: ICD-10-CM

## 2019-06-14 RX ORDER — LEVOTHYROXINE SODIUM 25 UG/1
25 TABLET ORAL DAILY
Qty: 90 TABLET | Refills: 0 | Status: SHIPPED | OUTPATIENT
Start: 2019-06-14 | End: 2019-07-22

## 2019-08-13 ENCOUNTER — PES CALL (OUTPATIENT)
Dept: ADMINISTRATIVE | Facility: CLINIC | Age: 74
End: 2019-08-13

## 2019-09-03 ENCOUNTER — HOSPITAL ENCOUNTER (OUTPATIENT)
Dept: RADIOLOGY | Facility: HOSPITAL | Age: 74
Discharge: HOME | End: 2019-09-03
Attending: FAMILY MEDICINE
Payer: MEDICARE

## 2019-09-03 DIAGNOSIS — Z12.31 ENCOUNTER FOR SCREENING MAMMOGRAM FOR BREAST CANCER: ICD-10-CM

## 2019-09-03 DIAGNOSIS — Z78.0 MENOPAUSE: ICD-10-CM

## 2019-09-03 PROCEDURE — 77067 SCR MAMMO BI INCL CAD: CPT | Mod: TC,HCNC,PO

## 2019-09-03 PROCEDURE — 77080 DXA BONE DENSITY AXIAL: CPT | Mod: TC,HCNC,PO

## 2019-09-03 PROCEDURE — 77063 MAMMO DIGITAL SCREENING BILAT WITH TOMOSYNTHESIS_CAD: ICD-10-PCS | Mod: 26,HCNC,, | Performed by: RADIOLOGY

## 2019-09-03 PROCEDURE — 77067 MAMMO DIGITAL SCREENING BILAT WITH TOMOSYNTHESIS_CAD: ICD-10-PCS | Mod: 26,HCNC,, | Performed by: RADIOLOGY

## 2019-09-03 PROCEDURE — 77080 DXA BONE DENSITY AXIAL: CPT | Mod: 26,HCNC,, | Performed by: RADIOLOGY

## 2019-09-03 PROCEDURE — 77063 BREAST TOMOSYNTHESIS BI: CPT | Mod: 26,HCNC,, | Performed by: RADIOLOGY

## 2019-09-03 PROCEDURE — 77067 SCR MAMMO BI INCL CAD: CPT | Mod: 26,HCNC,, | Performed by: RADIOLOGY

## 2019-09-03 PROCEDURE — 77080 DEXA BONE DENSITY SPINE HIP: ICD-10-PCS | Mod: 26,HCNC,, | Performed by: RADIOLOGY

## 2019-09-06 ENCOUNTER — PES CALL (OUTPATIENT)
Dept: ADMINISTRATIVE | Facility: CLINIC | Age: 74
End: 2019-09-06

## 2019-10-03 ENCOUNTER — OFFICE VISIT (OUTPATIENT)
Dept: GASTROENTEROLOGY | Facility: CLINIC | Age: 74
End: 2019-10-03
Payer: MEDICARE

## 2019-10-03 VITALS
WEIGHT: 187.63 LBS | DIASTOLIC BLOOD PRESSURE: 78 MMHG | HEART RATE: 62 BPM | RESPIRATION RATE: 18 BRPM | HEIGHT: 64 IN | BODY MASS INDEX: 32.03 KG/M2 | SYSTOLIC BLOOD PRESSURE: 160 MMHG

## 2019-10-03 DIAGNOSIS — R13.10 DYSPHAGIA, UNSPECIFIED TYPE: Primary | ICD-10-CM

## 2019-10-03 DIAGNOSIS — K21.9 GASTROESOPHAGEAL REFLUX DISEASE WITHOUT ESOPHAGITIS: Chronic | ICD-10-CM

## 2019-10-03 PROCEDURE — 99999 PR PBB SHADOW E&M-EST. PATIENT-LVL III: ICD-10-PCS | Mod: PBBFAC,HCNC,, | Performed by: INTERNAL MEDICINE

## 2019-10-03 PROCEDURE — 1101F PR PT FALLS ASSESS DOC 0-1 FALLS W/OUT INJ PAST YR: ICD-10-PCS | Mod: HCNC,CPTII,S$GLB, | Performed by: INTERNAL MEDICINE

## 2019-10-03 PROCEDURE — 3078F PR MOST RECENT DIASTOLIC BLOOD PRESSURE < 80 MM HG: ICD-10-PCS | Mod: HCNC,CPTII,S$GLB, | Performed by: INTERNAL MEDICINE

## 2019-10-03 PROCEDURE — 3077F PR MOST RECENT SYSTOLIC BLOOD PRESSURE >= 140 MM HG: ICD-10-PCS | Mod: HCNC,CPTII,S$GLB, | Performed by: INTERNAL MEDICINE

## 2019-10-03 PROCEDURE — 1101F PT FALLS ASSESS-DOCD LE1/YR: CPT | Mod: HCNC,CPTII,S$GLB, | Performed by: INTERNAL MEDICINE

## 2019-10-03 PROCEDURE — 99203 OFFICE O/P NEW LOW 30 MIN: CPT | Mod: HCNC,S$GLB,, | Performed by: INTERNAL MEDICINE

## 2019-10-03 PROCEDURE — 99999 PR PBB SHADOW E&M-EST. PATIENT-LVL III: CPT | Mod: PBBFAC,HCNC,, | Performed by: INTERNAL MEDICINE

## 2019-10-03 PROCEDURE — 99203 PR OFFICE/OUTPT VISIT, NEW, LEVL III, 30-44 MIN: ICD-10-PCS | Mod: HCNC,S$GLB,, | Performed by: INTERNAL MEDICINE

## 2019-10-03 PROCEDURE — 3077F SYST BP >= 140 MM HG: CPT | Mod: HCNC,CPTII,S$GLB, | Performed by: INTERNAL MEDICINE

## 2019-10-03 PROCEDURE — 3078F DIAST BP <80 MM HG: CPT | Mod: HCNC,CPTII,S$GLB, | Performed by: INTERNAL MEDICINE

## 2019-10-03 RX ORDER — TRIPROLIDINE/PSEUDOEPHEDRINE 2.5MG-60MG
200 TABLET ORAL EVERY 6 HOURS PRN
COMMUNITY
End: 2020-06-10

## 2019-10-03 NOTE — PROGRESS NOTES
Subjective:       Patient ID: Sofiya Zarate is a 74 y.o. female.    Chief Complaint: No chief complaint on file.    Ms. Zarate presents today for GI eval, on rec'd from Dr. Carter.  She reports he thought it was time to look again, since she's reflux for yrs and has been on PPIs for years.  But also, see his OV note from May.  She occasionally experiences dysphagia, mainly with breads and meats, but cruzito breads.  Otherwise, her pantoprazole controls her symptoms.  Her last EGD was 15-20 yrs ago.  Her last colonoscopy was 2007.  She's been doing Cologuard the last few yrs, and has been negative. (Last colonoscopy, she got sick with the prep.  Doesn't plan to do another colonoscopy unless she fails the Cologuard.        OV Note 5/8/2019:  Gastrointestinal: Negative for blood in stool, constipation and diarrhea.        Had neg cologuard---occ gerd  Some dysphagia of solids-- no n/v no hoarseness--no nocturnal sx--nexium with relief   Assessment & Plan    Esophageal dysphagia  -     pantoprazole (PROTONIX) 40 MG tablet; Take 1 tablet (40 mg total) by mouth once daily.  Dispense: 30 tablet; Refill: 2  -     Ambulatory Referral to Gastroenterology      Last colonoscopy 7/20/2007:  Internal hemorrhoids, otherwise normal colon ([place]).    Review of Systems   Constitutional: Negative for activity change, appetite change, fatigue, fever and unexpected weight change.   HENT: Positive for trouble swallowing. Negative for dental problem, drooling, mouth sores, sore throat and voice change.    Eyes: Negative.    Respiratory: Negative for cough, shortness of breath, wheezing and stridor.    Cardiovascular: Negative for chest pain.   Gastrointestinal: Negative for abdominal distention, abdominal pain, anal bleeding, blood in stool, constipation, diarrhea, nausea, rectal pain and vomiting.   Genitourinary: Negative.    Musculoskeletal: Negative for arthralgias, joint swelling, myalgias and neck stiffness.   Skin: Negative for color  change and rash.   Neurological: Negative for weakness.   Hematological: Negative for adenopathy. Does not bruise/bleed easily.   Psychiatric/Behavioral: Negative for agitation, behavioral problems, confusion, decreased concentration and dysphoric mood.       Objective:      Physical Exam   Constitutional: She is oriented to person, place, and time. She appears well-developed and well-nourished. No distress.   HENT:   Head: Normocephalic.   Nose: Nose normal.   Mouth/Throat: Oropharynx is clear and moist. No oropharyngeal exudate.   Eyes: Conjunctivae are normal. No scleral icterus.   Neck: Neck supple. No tracheal deviation present. No thyromegaly present.   Cardiovascular: Normal rate, regular rhythm, normal heart sounds and intact distal pulses. Exam reveals no gallop.   No murmur heard.  Pulmonary/Chest: Effort normal and breath sounds normal. She has no wheezes. She has no rales.   Abdominal: Soft. Bowel sounds are normal. She exhibits no distension and no mass. There is no tenderness. There is no guarding.   Genitourinary: Rectal exam shows guaiac negative stool.   Musculoskeletal: Normal range of motion.   Lymphadenopathy:     She has no cervical adenopathy.   Neurological: She is alert and oriented to person, place, and time.   Skin: Skin is warm and dry. No rash noted. No erythema.   Psychiatric: She has a normal mood and affect. Her behavior is normal.       Assessment:         Dysphagia, unspecified type    Gastroesophageal reflux disease without esophagitis      Plan:        1. Sched for EGD, poss dil   2. Cont meds the same.

## 2019-10-03 NOTE — LETTER
October 3, 2019      Matt Carter MD  [id number] [facility] [address]  [facility] [address]           [facility]  [address] [place] [address]  Phone: [phone]          Patient: Sofiya Zarate   MR Number: [id number]   YOB: 1945   Date of Visit: 10/3/2019       Dear Dr. Matt Carter:    Thank you for referring Sofiya Zarate to me for evaluation. Attached you will find relevant portions of my assessment and plan of care.    If you have questions, please do not hesitate to call me. I look forward to following Sofiya Zarate along with you.    Sincerely,    Alvarado Lee Jr., MD    Enclosure  CC:  No Recipients    If you would like to receive this communication electronically, please contact [email or web address] or [phone] to request more information on [employer] Link access.    For providers and/or their staff who would like to refer a patient to [facility], please contact us through our one-stop-shop provider referral line, [facility] , at [phone].    If you feel you have received this communication in error or would no longer like to receive these types of communications, please e-mail [email or web address]

## 2019-10-15 NOTE — H&P
History & Physical - Short Stay  Gastroenterology      SUBJECTIVE:     Procedure: Gastroscopy    Chief Complaint/Indication for Procedure: Dysphagia    History of Present Illness:  Office Visit     10/3/2019  [facility]      Alvarado Lee Jr., MD   Gastroenterology   Dysphagia, unspecified type +1 more   Dx   Advice Only   ; Referred by Matt Carter MD   Reason for Visit    Progress Notes        Subjective:       Patient ID: Sofiya Zarate is a 74 y.o. female.     Chief Complaint: No chief complaint on file.     Ms. Zarate presents today for GI eval, on rec'd from Dr. Carter.  She reports he thought it was time to look again, since she's reflux for yrs and has been on PPIs for years.  But also, see his OV note from May.  She occasionally experiences dysphagia, mainly with breads and meats, but cruzito breads.  Otherwise, her pantoprazole controls her symptoms.  Her last EGD was 15-20 yrs ago.  Her last colonoscopy was 2007.  She's been doing Cologuard the last few yrs, and has been negative. (Last colonoscopy, she got sick with the prep.  Doesn't plan to do another colonoscopy unless she fails the Cologuard.           OV Note 5/8/2019:  Gastrointestinal: Negative for blood in stool, constipation and diarrhea.        Had neg cologuard---occ gerd  Some dysphagia of solids-- no n/v no hoarseness--no nocturnal sx--nexium with relief   Assessment & Plan    Esophageal dysphagia  -     pantoprazole (PROTONIX) 40 MG tablet; Take 1 tablet (40 mg total) by mouth once daily.  Dispense: 30 tablet; Refill: 2  -     Ambulatory Referral to Gastroenterology        Last colonoscopy 7/20/2007:  Internal hemorrhoids, otherwise normal colon ([place]).    Assessment:         Dysphagia, unspecified type     Gastroesophageal reflux disease without esophagitis        Plan:        1. Sched for EGD, poss dil   2. Cont meds the same.               PTA Medications   Medication Sig    aspirin (ECOTRIN) 81 MG EC tablet Take  81 mg by mouth once daily.     atorvastatin (LIPITOR) 20 MG tablet Take 1 tablet (20 mg total) by mouth once daily.    b complex vitamins tablet Take 1 tablet by mouth once daily.    CHOLECALCIFEROL, VITAMIN D3, (VITAMIN D3 ORAL) Take 5,000 mcg by mouth once daily.    ibuprofen (ADVIL,MOTRIN) 100 mg/5 mL suspension Take 200 mg by mouth every 6 (six) hours as needed for Temperature greater than.    levothyroxine (SYNTHROID) 25 MCG tablet Take 1 tablet (25 mcg total) by mouth once daily.    metoprolol tartrate (LOPRESSOR) 50 MG tablet TAKE ONE-HALF TABLET BY MOUTH TWICE DAILY    naproxen sodium (ALEVE) 220 mg Cap Take by mouth daily as needed.    pantoprazole (PROTONIX) 40 MG tablet Take 1 tablet (40 mg total) by mouth once daily.    potassium 99 mg Tab Take by mouth nightly.       Review of patient's allergies indicates:  No Known Allergies     Past Medical History:   Diagnosis Date    Coronary artery disease     1 coronary stent    DJD (degenerative joint disease) of knee     Encounter for blood transfusion     post op    Hyperlipidemia     Hypertension     Hypothyroidism     Lumbar disc disease     PAD (peripheral artery disease)      Past Surgical History:   Procedure Laterality Date    CELIAC ARTERY STENT      COLONOSCOPY  07/20/2007    JAJA.   Small internal hemorrhoids, otherwise normal colon.  Repeat 10 yrs.    CORONARY ANGIOPLASTY WITH STENT PLACEMENT      1 stent    shirley      ESOPHAGOGASTRODUODENOSCOPY      TOTAL KNEE ARTHROPLASTY Left     TUBAL LIGATION       Family History   Problem Relation Age of Onset    Hypertension Mother     Osteoporosis Mother     Heart disease Father     Heart attack Father     Heart disease Sister     Diabetes Sister     Clotting disorder Maternal Uncle     Stroke Maternal Grandmother     Heart disease Brother     Diabetes Brother     Alzheimer's disease Maternal Aunt      Social History     Tobacco Use    Smoking status: Never Smoker     "Smokeless tobacco: Never Used   Substance Use Topics    Alcohol use: No    Drug use: No         OBJECTIVE:     Vital Signs (Most Recent)  Temp: 97.5 °F (36.4 °C) (10/16/19 1019)  Pulse: 65 (10/16/19 1019)  Resp: 15 (10/16/19 1019)  BP: (!) 175/75 (10/16/19 1019)  SpO2: 95 % (10/16/19 1019)    Physical Exam:  :Ht 5' 4" (1.626 m)   Wt 85.1 kg (187 lb 9.8 oz)   BMI 32.20 kg/m²        GENERAL:  Comfortable, in no acute distress.                                 HEENT EXAM:  Nonicteric.  No adenopathy.  Oropharynx is clear.      NECK:  Supple.                                                               LUNGS:  Clear.                                                               CARDIAC:  Regular rate and rhythm.  S1, S2.  No murmur.                 ABDOMEN:  Obese.  Soft, positive bowel sounds, nontender.  No hepatosplenomegaly or masses.  No rebound or guarding.                  EXTREMITIES:  No edema.     MENTAL STATUS:  Alert and oriented.    ASSESSMENT/PLAN:     Assessment: Dysphagia.  GERD.    Plan: Gastroscopy    Anesthesia Plan:   MAC / General Anaesthesia    ASA Grade: ASA 2 - Patient with mild systemic disease with no functional limitations    MALLAMPATI SCORE: II (hard and soft palate, upper portion of tonsils anduvula visible)    "

## 2019-10-16 ENCOUNTER — ANESTHESIA EVENT (OUTPATIENT)
Dept: ENDOSCOPY | Facility: HOSPITAL | Age: 74
End: 2019-10-16
Payer: MEDICARE

## 2019-10-16 ENCOUNTER — HOSPITAL ENCOUNTER (OUTPATIENT)
Facility: HOSPITAL | Age: 74
Discharge: HOME | End: 2019-10-16
Attending: INTERNAL MEDICINE | Admitting: INTERNAL MEDICINE
Payer: MEDICARE

## 2019-10-16 ENCOUNTER — ANESTHESIA (OUTPATIENT)
Dept: ENDOSCOPY | Facility: HOSPITAL | Age: 74
End: 2019-10-16
Payer: MEDICARE

## 2019-10-16 DIAGNOSIS — R13.19 ESOPHAGEAL DYSPHAGIA: ICD-10-CM

## 2019-10-16 DIAGNOSIS — R13.10 DYSPHAGIA: ICD-10-CM

## 2019-10-16 LAB — H PYLORI INDEX VALUE: NEGATIVE

## 2019-10-16 PROCEDURE — 43248 EGD GUIDE WIRE INSERTION: CPT | Mod: HCNC,,, | Performed by: INTERNAL MEDICINE

## 2019-10-16 PROCEDURE — 25000003 PHARM REV CODE 250: Mod: HCNC,PO | Performed by: INTERNAL MEDICINE

## 2019-10-16 PROCEDURE — 25000003 PHARM REV CODE 250: Mod: HCNC,PO | Performed by: NURSE ANESTHETIST, CERTIFIED REGISTERED

## 2019-10-16 PROCEDURE — 37000009 HC ANESTHESIA EA ADD 15 MINS: Mod: HCNC,PO | Performed by: INTERNAL MEDICINE

## 2019-10-16 PROCEDURE — 63600175 PHARM REV CODE 636 W HCPCS: Mod: HCNC,PO | Performed by: NURSE ANESTHETIST, CERTIFIED REGISTERED

## 2019-10-16 PROCEDURE — 43239 EGD BIOPSY SINGLE/MULTIPLE: CPT | Mod: 59,HCNC,, | Performed by: INTERNAL MEDICINE

## 2019-10-16 PROCEDURE — 63600175 PHARM REV CODE 636 W HCPCS: Mod: HCNC,PO | Performed by: INTERNAL MEDICINE

## 2019-10-16 PROCEDURE — D9220A PRA ANESTHESIA: Mod: HCNC,ANES,, | Performed by: ANESTHESIOLOGY

## 2019-10-16 PROCEDURE — 88305 TISSUE EXAM BY PATHOLOGIST: CPT | Mod: HCNC,59 | Performed by: PATHOLOGY

## 2019-10-16 PROCEDURE — 88305 TISSUE SPECIMEN TO PATHOLOGY - SURGERY: ICD-10-PCS | Mod: 26,HCNC,, | Performed by: PATHOLOGY

## 2019-10-16 PROCEDURE — 87449 NOS EACH ORGANISM AG IA: CPT | Mod: HCNC,PO | Performed by: INTERNAL MEDICINE

## 2019-10-16 PROCEDURE — 43239 EGD BIOPSY SINGLE/MULTIPLE: CPT | Mod: HCNC,PO | Performed by: INTERNAL MEDICINE

## 2019-10-16 PROCEDURE — 43239 PR EGD, FLEX, W/BIOPSY, SGL/MULTI: ICD-10-PCS | Mod: 59,HCNC,, | Performed by: INTERNAL MEDICINE

## 2019-10-16 PROCEDURE — 43248 EGD GUIDE WIRE INSERTION: CPT | Mod: HCNC,PO | Performed by: INTERNAL MEDICINE

## 2019-10-16 PROCEDURE — D9220A PRA ANESTHESIA: ICD-10-PCS | Mod: HCNC,ANES,, | Performed by: ANESTHESIOLOGY

## 2019-10-16 PROCEDURE — 43248 PR EGD, FLEX, W/DILATION OVER GUIDEWIRE: ICD-10-PCS | Mod: HCNC,,, | Performed by: INTERNAL MEDICINE

## 2019-10-16 PROCEDURE — 88305 TISSUE EXAM BY PATHOLOGIST: CPT | Mod: 26,HCNC,, | Performed by: PATHOLOGY

## 2019-10-16 PROCEDURE — 37000008 HC ANESTHESIA 1ST 15 MINUTES: Mod: HCNC,PO | Performed by: INTERNAL MEDICINE

## 2019-10-16 PROCEDURE — 27201012 HC FORCEPS, HOT/COLD, DISP: Mod: HCNC,PO | Performed by: INTERNAL MEDICINE

## 2019-10-16 RX ORDER — LIDOCAINE HCL/PF 100 MG/5ML
SYRINGE (ML) INTRAVENOUS
Status: DISCONTINUED | OUTPATIENT
Start: 2019-10-16 | End: 2019-10-16

## 2019-10-16 RX ORDER — SODIUM CHLORIDE, SODIUM LACTATE, POTASSIUM CHLORIDE, CALCIUM CHLORIDE 600; 310; 30; 20 MG/100ML; MG/100ML; MG/100ML; MG/100ML
INJECTION, SOLUTION INTRAVENOUS CONTINUOUS
Status: DISCONTINUED | OUTPATIENT
Start: 2019-10-16 | End: 2019-10-16 | Stop reason: HOSPADM

## 2019-10-16 RX ORDER — LIDOCAINE HYDROCHLORIDE 10 MG/ML
1 INJECTION INFILTRATION; PERINEURAL ONCE
Status: COMPLETED | OUTPATIENT
Start: 2019-10-16 | End: 2019-10-16

## 2019-10-16 RX ORDER — GLYCOPYRROLATE 0.2 MG/ML
INJECTION INTRAMUSCULAR; INTRAVENOUS
Status: DISCONTINUED | OUTPATIENT
Start: 2019-10-16 | End: 2019-10-16

## 2019-10-16 RX ORDER — PROPOFOL 10 MG/ML
VIAL (ML) INTRAVENOUS
Status: DISCONTINUED | OUTPATIENT
Start: 2019-10-16 | End: 2019-10-16

## 2019-10-16 RX ORDER — SODIUM CHLORIDE 0.9 % (FLUSH) 0.9 %
10 SYRINGE (ML) INJECTION
Status: DISCONTINUED | OUTPATIENT
Start: 2019-10-16 | End: 2019-10-16 | Stop reason: HOSPADM

## 2019-10-16 RX ORDER — PANTOPRAZOLE SODIUM 40 MG/1
40 TABLET, DELAYED RELEASE ORAL
Qty: 90 TABLET | Refills: 3 | Status: SHIPPED | OUTPATIENT
Start: 2019-10-16 | End: 2021-03-09

## 2019-10-16 RX ORDER — PROPOFOL 10 MG/ML
VIAL (ML) INTRAVENOUS CONTINUOUS PRN
Status: DISCONTINUED | OUTPATIENT
Start: 2019-10-16 | End: 2019-10-16

## 2019-10-16 RX ADMIN — LIDOCAINE HYDROCHLORIDE 50 MG: 20 INJECTION, SOLUTION INTRAVENOUS at 10:10

## 2019-10-16 RX ADMIN — PROPOFOL 75 MG: 10 INJECTION, EMULSION INTRAVENOUS at 10:10

## 2019-10-16 RX ADMIN — LIDOCAINE HYDROCHLORIDE 0.2 ML: 10 INJECTION, SOLUTION EPIDURAL; INFILTRATION; INTRACAUDAL; PERINEURAL at 10:10

## 2019-10-16 RX ADMIN — GLYCOPYRROLATE 0.2 MG: 0.2 INJECTION, SOLUTION INTRAMUSCULAR; INTRAVENOUS at 10:10

## 2019-10-16 RX ADMIN — PROPOFOL 130 MCG/KG/MIN: 10 INJECTION, EMULSION INTRAVENOUS at 10:10

## 2019-10-16 RX ADMIN — SODIUM CHLORIDE, SODIUM LACTATE, POTASSIUM CHLORIDE, AND CALCIUM CHLORIDE: .6; .31; .03; .02 INJECTION, SOLUTION INTRAVENOUS at 10:10

## 2019-10-16 RX ADMIN — PROPOFOL 25 MG: 10 INJECTION, EMULSION INTRAVENOUS at 10:10

## 2019-10-16 NOTE — DISCHARGE INSTRUCTIONS
Recovery After Procedural Sedation (Adult)  You have been given medicine by vein to make you sleep during your surgery. This may have included both a pain medicine and sleeping medicine. Most of the effects have worn off. But you may still have some drowsiness for the next 6 to 8 hours.  Home care  Follow these guidelines when you get home:  · For the next 8 hours, you should be watched by a responsible adult. This person should make sure your condition is not getting worse.  · Don't drink any alcohol for the next 24 hours.  · Don't drive, operate dangerous machinery, or make important business or personal decisions during the next 24 hours.  Note: Your healthcare provider may tell you not to take any medicine by mouth for pain or sleep in the next 4 hours. These medicines may react with the medicines you were given in the hospital. This could cause a much stronger response than usual.  Follow-up care  Follow up with your healthcare provider if you are not alert and back to your usual level of activity within 12 hours.  When to seek medical advice  Call your healthcare provider right away if any of these occur:  · Drowsiness gets worse  · Weakness or dizziness gets worse  · Repeated vomiting  · You can't be awakened   Date Last Reviewed: 10/18/2016  © 2880-2915 [employer]. [address] [place] [address], [place] [address]. All rights reserved. This information is not intended as a substitute for professional medical care. Always follow your healthcare professional's instructions.        Tips to Control Acid Reflux    To control acid reflux, youll need to make some basic diet and lifestyle changes. The simple steps outlined below may be all youll need to ease discomfort.  Watch what you eat  · Avoid fatty foods and spicy foods.  · Eat fewer acidic foods, such as citrus and tomato-based foods. These can increase symptoms.  · Limit drinking alcohol, caffeine, and fizzy beverages. All increase acid  reflux.  · Try limiting chocolate, peppermint, and spearmint. These can worsen acid reflux in some people.  Watch when you eat  · Avoid lying down for 3 hours after eating.  · Do not snack before going to bed.  Raise your head  Raising your head and upper body by 4 to 6 inches helps limit reflux when youre lying down. Put blocks under the head of your bed frame to raise it.  Other changes  · Lose weight, if you need to  · Dont exercise near bedtime  · Avoid tight-fitting clothes  · Limit aspirin and ibuprofen  · Stop smoking   Date Last Reviewed: 7/1/2016  © 2550-9199 [employer]. [address] [place] [address], [place] [address]. All rights reserved. This information is not intended as a substitute for professional medical care. Always follow your healthcare professional's instructions.

## 2019-10-16 NOTE — TRANSFER OF CARE
"Anesthesia Transfer of Care Note    Patient: Sofiya Zarate    Procedure(s) Performed: Procedure(s) (LRB):  EGD (ESOPHAGOGASTRODUODENOSCOPY) (N/A)    Patient location: PACU    Anesthesia Type: general    Transport from OR: Transported from OR on 100% O2 by closed face mask with adequate spontaneous ventilation    Post pain: adequate analgesia    Post assessment: no apparent anesthetic complications and tolerated procedure well    Post vital signs: stable    Level of consciousness: awake    Nausea/Vomiting: no nausea/vomiting    Complications: none    Transfer of care protocol was followed      Last vitals:   Visit Vitals  BP (!) 175/75 (BP Location: Right arm, Patient Position: Lying)   Pulse 65   Temp 36.4 °C (97.5 °F) (Skin)   Resp 15   Ht 5' 4" (1.626 m)   Wt 83.9 kg (185 lb)   SpO2 95%   Breastfeeding? No   BMI 31.76 kg/m²     "

## 2019-10-16 NOTE — ANESTHESIA PREPROCEDURE EVALUATION
10/16/2019  Sofiya Zarate is a 74 y.o., female.    Anesthesia Evaluation    I have reviewed the Patient Summary Reports.    I have reviewed the Nursing Notes.   I have reviewed the Medications.     Review of Systems  Cardiovascular:   Exercise tolerance: good Hypertension CAD  CABG/stent  stable   Pulmonary:  Pulmonary Normal    Hepatic/GI:   GERD Celiac artery stent   Musculoskeletal:   Arthritis     Endocrine:   Hypothyroidism    Psych:  Psychiatric Normal           Physical Exam  General:  Obesity, Well nourished    Airway/Jaw/Neck:  Airway Findings: Mouth Opening: Normal Tongue: Normal  General Airway Assessment: Adult  Mallampati: III  Improves to III with phonation.  Jaw/Neck Findings:  Neck ROM: Normal ROM      Dental:  Dental Findings: In tact   Chest/Lungs:  Chest/Lungs Findings: Clear to auscultation, Normal Respiratory Rate         Mental Status:  Mental Status Findings:  Cooperative, Alert and Oriented         Anesthesia Plan  Type of Anesthesia, risks & benefits discussed:  Anesthesia Type:  general  Patient's Preference:   Intra-op Monitoring Plan: standard ASA monitors  Intra-op Monitoring Plan Comments:   Post Op Pain Control Plan:   Post Op Pain Control Plan Comments:   Induction:   IV  Beta Blocker:  Patient is on a Beta-Blocker and has received one dose within the past 24 hours (No further documentation required).       Informed Consent: Patient understands risks and agrees with Anesthesia plan.  Questions answered. Anesthesia consent signed with patient.  ASA Score: 3     Day of Surgery Review of History & Physical:            Ready For Surgery From Anesthesia Perspective.

## 2019-10-16 NOTE — BRIEF OP NOTE
Discharge Note  Short Stay      SUMMARY     Admit Date: 10/16/2019    Attending Physician: Alvarado Lee Jr., MD     Discharge Physician: Alvarado Lee Jr., MD    Discharge Date: 10/16/2019 11:22 AM    Final Diagnosis: * No pre-op diagnosis entered *  Impression:          - Normal oropharynx.                       - Normal cricopharyngeus.                       - Normal upper third of esophagus, middle third of                        esophagus and lower third of esophagus.                       - Benign-appearing esophageal stenosis. Dilated, 51                        Fr.                       - White nummular lesions in gastric mucosa. Biopsied.                       - Gastric mucosal atrophy.                       - Mild antritis. Biopsied.                       - Normal stomach otherwise.                       - Normal examined duodenum.  Recommendation:      - Discharge patient to home.                       - Await pathology and CLOtest results.                       - Observe patient's clinical course following                        today's procedure with therapeutic intervention.                       - Follow an antireflux regimen.                       - Use Protonix (pantoprazole) 40 mg PO daily.                       - Repeat upper endoscopy PRN for retreatment.                       - Call the G.I. clinic in 2 weeks for reports (if                        you haven't heard from us sooner) [phone].  Alvarado Lee MD  10/16/2019   Disposition: HOME OR SELF CARE    Patient Instructions:   Current Discharge Medication List      CONTINUE these medications which have CHANGED    Details   pantoprazole (PROTONIX) 40 MG tablet Take 1 tablet (40 mg total) by mouth before breakfast.  Qty: 90 tablet, Refills: 3    Associated Diagnoses: Esophageal dysphagia         CONTINUE these medications which have NOT CHANGED    Details   aspirin (ECOTRIN) 81 MG EC tablet Take 81 mg by mouth once daily.        atorvastatin (LIPITOR) 20 MG tablet Take 1 tablet (20 mg total) by mouth once daily.  Qty: 90 tablet, Refills: 3    Associated Diagnoses: Hyperlipidemia, unspecified hyperlipidemia type      b complex vitamins tablet Take 1 tablet by mouth once daily.      CHOLECALCIFEROL, VITAMIN D3, (VITAMIN D3 ORAL) Take 5,000 mcg by mouth once daily.      ibuprofen (ADVIL,MOTRIN) 100 mg/5 mL suspension Take 200 mg by mouth every 6 (six) hours as needed for Temperature greater than.      levothyroxine (SYNTHROID) 25 MCG tablet Take 1 tablet (25 mcg total) by mouth once daily.  Qty: 90 tablet, Refills: 1    Associated Diagnoses: Hypothyroidism, unspecified type      metoprolol tartrate (LOPRESSOR) 50 MG tablet TAKE ONE-HALF TABLET BY MOUTH TWICE DAILY  Qty: 90 tablet, Refills: 3      naproxen sodium (ALEVE) 220 mg Cap Take by mouth daily as needed.      potassium 99 mg Tab Take by mouth nightly.             Discharge Procedure Orders (must include Diet, Follow-up, Activity)    Follow Up:  Follow up with PCP as per your routine.  Please follow an anti reflux diet.  Activity as tolerated.    No driving day of procedure.

## 2019-10-16 NOTE — [employer] PATIENT INSTRUCTIONS
Discharge Summary/Instructions after an Endoscopic Procedure  Patient Name: Sofiya Zarate  Patient MRN: [id number]  Patient YOB: 1945  Wednesday, October 16, 2019  Alvarado Lee MD  RESTRICTIONS:  During your procedure today, you received medications for sedation.  These   medications may affect your judgment, balance and coordination.  Therefore,   for 24 hours, you have the following restrictions:   - DO NOT drive a car, operate machinery, make legal/financial decisions,   sign important papers or drink alcohol.    ACTIVITY:  Today: no heavy lifting, straining or running due to procedural   sedation/anesthesia.  The following day: return to full activity including work.  DIET:  Eat and drink normally unless instructed otherwise.     TREATMENT FOR COMMON SIDE EFFECTS:  - Mild abdominal pain, nausea, belching, bloating or excessive gas:  rest,   eat lightly and use a heating pad.  - Sore Throat: treat with throat lozenges and/or gargle with warm salt   water.  - Because air was used during the procedure, expelling large amounts of air   from your rectum or belching is normal.  - If a bowel prep was taken, you may not have a bowel movement for 1-3 days.    This is normal.  SYMPTOMS TO WATCH FOR AND REPORT TO YOUR PHYSICIAN:  1. Abdominal pain or bloating, other than gas cramps.  2. Chest pain.  3. Back pain.  4. Signs of infection such as: chills or fever occurring within 24 hours   after the procedure.  5. Rectal bleeding, which would show as bright red, maroon, or black stools.   (A tablespoon of blood from the rectum is not serious, especially if   hemorrhoids are present.)  6. Vomiting.  7. Weakness or dizziness.  GO DIRECTLY TO THE NEAREST EMERGENCY ROOM IF YOU HAVE ANY OF THE FOLLOWING:      Difficulty breathing              Chills and/or fever over 101 F   Persistent vomiting and/or vomiting blood   Severe abdominal pain   Severe chest pain   Black, tarry stools   Bleeding- more than one  tablespoon   Any other symptom or condition that you feel may need urgent attention  Your doctor recommends these additional instructions:  If any biopsies were taken, your doctors clinic will contact you in 1 to 2   weeks with any results.  Follow an antireflux regimen.  This includes:       - Do not lie down for at least 3 to 4 hours after meals.        - Raise the head of the bed 4 to 6 inches.        - Decrease excess weight.        - Avoid citrus juices and other acidic foods, alcohol, chocolate, mints,   coffee and other caffeinated beverages, carbonated beverages, fatty and   fried foods.        - Avoid tight-fitting clothing.        - Avoid cigarettes and other tobacco products.   Take Protonix (pantoprazole) 40 mg by mouth once a day.   Your physician has recommended a repeat upper endoscopy as needed for   retreatment.  For questions, problems or results please call your physician - Alvarado Lee MD at Work:  [phone].  EMERGENCY PHONE NUMBER: [phone], LAB RESULTS: [phone]  IF A COMPLICATION OR EMERGENCY SITUATION ARISES AND YOU ARE UNABLE TO REACH   YOUR PHYSICIAN - GO DIRECTLY TO THE EMERGENCY ROOM.  ___________________________________________  Nurse Signature  ___________________________________________  Patient/Designated Responsible Party Signature  Alvarado Lee MD  10/16/2019 11:20:37 AM  This report has been verified and signed electronically.  [employer]

## 2019-10-16 NOTE — ANESTHESIA POSTPROCEDURE EVALUATION
Anesthesia Post Evaluation    Patient: Sofiya Zarate    Procedure(s) Performed: Procedure(s) (LRB):  EGD (ESOPHAGOGASTRODUODENOSCOPY) (N/A)    Final Anesthesia Type: general  Patient location during evaluation: PACU  Patient participation: Yes- Able to Participate  Level of consciousness: sedated and awake  Post-procedure vital signs: reviewed and stable  Pain management: adequate  Airway patency: patent  PONV status at discharge: No PONV  Anesthetic complications: no      Cardiovascular status: hypertensive and blood pressure returned to baseline  Respiratory status: spontaneous ventilation  Hydration status: euvolemic  Follow-up not needed.          Vitals Value Taken Time   /79 10/16/2019 11:30 AM   Temp 36.3 °C (97.3 °F) 10/16/2019 11:06 AM   Pulse 66 10/16/2019 11:30 AM   Resp 10 10/16/2019 11:30 AM   SpO2 98 % 10/16/2019 11:30 AM         Event Time     Out of Recovery 11:42:20          Pain/Gay Score: Gay Score: 9 (10/16/2019 11:30 AM)

## 2019-10-16 NOTE — PROGRESS NOTES
Pt discharged to home.  Discharge instructions given, pt and daughter stated understanding.   IV removed.  Pt left via wheelchair with daughter to home.

## 2019-10-17 VITALS
BODY MASS INDEX: 31.58 KG/M2 | HEART RATE: 66 BPM | SYSTOLIC BLOOD PRESSURE: 142 MMHG | HEIGHT: 64 IN | WEIGHT: 185 LBS | OXYGEN SATURATION: 98 % | RESPIRATION RATE: 10 BRPM | TEMPERATURE: 97 F | DIASTOLIC BLOOD PRESSURE: 79 MMHG

## 2019-11-12 ENCOUNTER — OFFICE VISIT (OUTPATIENT)
Dept: GASTROENTEROLOGY | Facility: CLINIC | Age: 74
End: 2019-11-12
Payer: MEDICARE

## 2019-11-12 VITALS
HEART RATE: 64 BPM | HEIGHT: 64 IN | SYSTOLIC BLOOD PRESSURE: 135 MMHG | DIASTOLIC BLOOD PRESSURE: 73 MMHG | BODY MASS INDEX: 31.88 KG/M2 | WEIGHT: 186.75 LBS | RESPIRATION RATE: 18 BRPM

## 2019-11-12 DIAGNOSIS — R19.5 POSITIVE FIT (FECAL IMMUNOCHEMICAL TEST): Primary | ICD-10-CM

## 2019-11-12 DIAGNOSIS — K21.9 GASTROESOPHAGEAL REFLUX DISEASE WITHOUT ESOPHAGITIS: ICD-10-CM

## 2019-11-12 DIAGNOSIS — K59.09 CHRONIC CONSTIPATION: ICD-10-CM

## 2019-11-12 DIAGNOSIS — K22.2 ESOPHAGEAL STENOSIS: ICD-10-CM

## 2019-11-12 DIAGNOSIS — Z87.19 HISTORY OF GASTRITIS: ICD-10-CM

## 2019-11-12 PROCEDURE — 1101F PR PT FALLS ASSESS DOC 0-1 FALLS W/OUT INJ PAST YR: ICD-10-PCS | Mod: CPTII,S$GLB,, | Performed by: NURSE PRACTITIONER

## 2019-11-12 PROCEDURE — 3075F SYST BP GE 130 - 139MM HG: CPT | Mod: CPTII,S$GLB,, | Performed by: NURSE PRACTITIONER

## 2019-11-12 PROCEDURE — 3075F PR MOST RECENT SYSTOLIC BLOOD PRESS GE 130-139MM HG: ICD-10-PCS | Mod: CPTII,S$GLB,, | Performed by: NURSE PRACTITIONER

## 2019-11-12 PROCEDURE — 99999 PR PBB SHADOW E&M-EST. PATIENT-LVL IV: ICD-10-PCS | Mod: PBBFAC,,, | Performed by: NURSE PRACTITIONER

## 2019-11-12 PROCEDURE — 3078F PR MOST RECENT DIASTOLIC BLOOD PRESSURE < 80 MM HG: ICD-10-PCS | Mod: CPTII,S$GLB,, | Performed by: NURSE PRACTITIONER

## 2019-11-12 PROCEDURE — 3078F DIAST BP <80 MM HG: CPT | Mod: CPTII,S$GLB,, | Performed by: NURSE PRACTITIONER

## 2019-11-12 PROCEDURE — 99214 OFFICE O/P EST MOD 30 MIN: CPT | Mod: S$GLB,,, | Performed by: NURSE PRACTITIONER

## 2019-11-12 PROCEDURE — 1101F PT FALLS ASSESS-DOCD LE1/YR: CPT | Mod: CPTII,S$GLB,, | Performed by: NURSE PRACTITIONER

## 2019-11-12 PROCEDURE — 99999 PR PBB SHADOW E&M-EST. PATIENT-LVL IV: CPT | Mod: PBBFAC,,, | Performed by: NURSE PRACTITIONER

## 2019-11-12 PROCEDURE — 99214 PR OFFICE/OUTPT VISIT, EST, LEVL IV, 30-39 MIN: ICD-10-PCS | Mod: S$GLB,,, | Performed by: NURSE PRACTITIONER

## 2019-11-12 RX ORDER — BISACODYL 5 MG
5 TABLET, DELAYED RELEASE (ENTERIC COATED) ORAL DAILY PRN
COMMUNITY
End: 2022-04-30 | Stop reason: CLARIF

## 2019-11-12 NOTE — PROGRESS NOTES
Subjective:       Patient ID: Sofiya Zarate is a 74 y.o. female, Body mass index is 32.05 kg/m².    Chief Complaint: Other (Abnormal fecal test)      Patient is new to me. Established patient of Dr. Lee.    Here for positive fit. Last c-scope in 2007, unremarkable. Denies any overt signs of bleeding.    Constipation   This is a chronic problem. The current episode started more than 1 year ago. The problem is unchanged. Her stool frequency is 1 time per day. The stool is described as firm and formed. The patient is on a high fiber diet. She does not exercise regularly. There has been adequate water intake. Pertinent negatives include no abdominal pain, bloating, diarrhea, difficulty urinating, fever, hematochezia, melena, nausea, vomiting or weight loss. Risk factors include obesity. She has tried laxatives (Currently: Dulcolax otc daily) for the symptoms. The treatment provided significant relief. Her past medical history is significant for abdominal surgery (appendectomy) and endocrine disease. There is no history of inflammatory bowel disease or irritable bowel syndrome. (Hx of GERD and dysphagia; GERD well controlled on Protonix 40 mg once daily; dysphagia improved on recent esophageal dilation)     Review of Systems   Constitutional: Negative for appetite change, fever, unexpected weight change and weight loss.   HENT: Negative for trouble swallowing.    Respiratory: Negative for cough and shortness of breath.    Cardiovascular: Negative for chest pain.   Gastrointestinal: Positive for constipation. Negative for abdominal pain, bloating, blood in stool, diarrhea, hematochezia, melena, nausea and vomiting.   Genitourinary: Negative for difficulty urinating and dysuria.   Musculoskeletal: Negative for gait problem.   Skin: Negative for rash.   Neurological: Negative for speech difficulty.   Psychiatric/Behavioral: Negative for confusion.       Past Medical History:   Diagnosis Date    Coronary artery disease      1 coronary stent    DJD (degenerative joint disease) of knee     Encounter for blood transfusion     post op    GERD (gastroesophageal reflux disease)     Hyperlipidemia     Hypertension     Hypothyroidism     Lumbar disc disease     PAD (peripheral artery disease)       Past Surgical History:   Procedure Laterality Date    APPENDECTOMY      CELIAC ARTERY STENT      COLONOSCOPY  07/20/2007    GUARISCO.   Small internal hemorrhoids, otherwise normal colon.  Repeat 10 yrs.    CORONARY ANGIOPLASTY WITH STENT PLACEMENT      1 stent    shirley      ESOPHAGOGASTRODUODENOSCOPY      ESOPHAGOGASTRODUODENOSCOPY N/A 10/16/2019    Procedure: EGD (ESOPHAGOGASTRODUODENOSCOPY);  Surgeon: Alvarado Lee Jr., MD;  Location: [facility] ENDO;  Service: Endoscopy;  Laterality: N/A;    TOTAL KNEE ARTHROPLASTY Left     TUBAL LIGATION      UPPER GASTROINTESTINAL ENDOSCOPY  10/16/2019    Dr. Lee; esophageal stenosis, dilated; gastritis; bx negative      Family History   Problem Relation Age of Onset    Hypertension Mother     Osteoporosis Mother     Heart disease Father     Heart attack Father     Heart disease Sister     Diabetes Sister     Clotting disorder Maternal Uncle     Stroke Maternal Grandmother     Heart disease Brother     Diabetes Brother     Alzheimer's disease Maternal Aunt     Colon cancer Neg Hx       Wt Readings from Last 10 Encounters:   11/12/19 84.7 kg (186 lb 11.7 oz)   10/30/19 84.3 kg (185 lb 12.8 oz)   10/14/19 83.9 kg (185 lb)   10/03/19 85.1 kg (187 lb 9.8 oz)   07/22/19 85.3 kg (188 lb)   05/08/19 83 kg (183 lb)   06/05/18 89 kg (196 lb 3.4 oz)   05/30/18 89.8 kg (197 lb 15.6 oz)   05/07/18 88.5 kg (195 lb 1.7 oz)   04/25/18 90.2 kg (198 lb 11.9 oz)     Lab Results   Component Value Date    WBC 9.36 05/09/2019    HGB 13.9 05/09/2019    HCT 42.8 05/09/2019    MCV 96 05/09/2019     05/09/2019     CMP  Sodium   Date Value Ref Range Status   05/09/2019 140 136 - 145 mmol/L Final      Potassium   Date Value Ref Range Status   05/09/2019 4.5 3.5 - 5.1 mmol/L Final     Chloride   Date Value Ref Range Status   05/09/2019 103 95 - 110 mmol/L Final     CO2   Date Value Ref Range Status   05/09/2019 28 22 - 31 mmol/L Final     Glucose   Date Value Ref Range Status   05/09/2019 99 70 - 110 mg/dL Final     Comment:     The ADA recommends the following guidelines for fasting glucose:  Normal:       less than 100 mg/dL  Prediabetes:  100 mg/dL to 125 mg/dL  Diabetes:     126 mg/dL or higher       BUN, Bld   Date Value Ref Range Status   05/09/2019 20 (H) 7 - 18 mg/dL Final     Creatinine   Date Value Ref Range Status   05/09/2019 0.67 0.50 - 1.40 mg/dL Final     Calcium   Date Value Ref Range Status   05/09/2019 9.5 8.4 - 10.2 mg/dL Final     Total Protein   Date Value Ref Range Status   07/22/2019 7.0 6.0 - 8.4 g/dL Final     Albumin   Date Value Ref Range Status   07/22/2019 4.0 3.5 - 5.2 g/dL Final     Total Bilirubin   Date Value Ref Range Status   07/22/2019 0.5 0.2 - 1.3 mg/dL Final     Alkaline Phosphatase   Date Value Ref Range Status   07/22/2019 81 38 - 145 U/L Final     AST   Date Value Ref Range Status   07/22/2019 31 14 - 36 U/L Final     ALT   Date Value Ref Range Status   07/22/2019 24 10 - 44 U/L Final     Anion Gap   Date Value Ref Range Status   05/09/2019 9 8 - 16 mmol/L Final     eGFR if    Date Value Ref Range Status   05/09/2019 >60 >60 mL/min/1.73 m^2 Final     eGFR if non    Date Value Ref Range Status   05/09/2019 >60 >60 mL/min/1.73 m^2 Final     Comment:     Calculation used to obtain the estimated glomerular filtration  rate (eGFR) is the CKD-EPI equation.           Lab Results   Component Value Date    TSH 2.640 10/30/2019            Reviewed prior medical records including endoscopy history (see surgical history).     Objective:      Physical Exam   Constitutional: She is oriented to person, place, and time. She appears well-developed and  well-nourished.   HENT:   Head: Normocephalic.   Eyes: Pupils are equal, round, and reactive to light.   Neck: Normal range of motion.   Cardiovascular: Normal rate, regular rhythm and normal heart sounds.   No murmur heard.  Pulmonary/Chest: Breath sounds normal. No respiratory distress. She has no wheezes.   Abdominal: Soft. Bowel sounds are normal. She exhibits no distension and no mass. There is no tenderness. There is no guarding.   Musculoskeletal: Normal range of motion.   Neurological: She is alert and oriented to person, place, and time.   Skin: Skin is warm and dry. No rash noted.   Non jaundiced   Psychiatric: She has a normal mood and affect. Her speech is normal.         Assessment:       1. Positive FIT (fecal immunochemical test)    2. Chronic constipation    3. Gastroesophageal reflux disease without esophagitis    4. Esophageal stenosis    5. History of gastritis           Plan:   All diagnoses and orders for this visit:    Positive FIT (fecal immunochemical test)   - Schedule Colonoscopy, discussed procedure with the patient, including risks and benefits, patient verbalized understanding    Chronic constipation   - Recommend daily exercise as tolerated, adequate water intake (six 8-oz glasses of water daily), and high fiber diet. OTC fiber supplements are recommended if diet does not reach daily fiber goal (20-30 grams daily), such as Metamucil, Citrucel, or FiberCon (take as directed, separate from other oral medications by >2 hours).   - Recommend taking an OTC stool softener such as Colace as directed to avoid hard stools and straining with bowel movements PRN   - Recommend trying OTC MiraLax once daily (17g PO) as directed   - If no improvement with above recommendations, try intermittently dosed Dulcolax OTC as directed (every 3-4  days) PRN to facilitate bowel movements    Gastroesophageal reflux disease without esophagitis & History of gastritis   - Continue Protonix 40 mg once daily   -  Take PPI in the morning 30-60 minutes before breakfast   - Educated patient on lifestyle modifications to help control/reduce reflux/abdominal pain including: avoid large meals, avoid eating within 2-3 hours of bedtime (avoid late night eating & lying down soon after eating), elevate head of bed if nocturnal symptoms are present, smoking cessation (if current smoker), & weight loss (if overweight).    - Educated to avoid known foods which trigger reflux symptoms & to minimize/avoid high-fat foods, chocolate, caffeine, citrus, alcohol, & tomato products.   - Advised to avoid/limit use of NSAID's, since they can cause GI upset, bleeding, and/or ulcers. If needed, take with food.     Esophageal stenosis   - Resolved   - EGD PRN for retreatment    If no improvement in symptoms or symptoms worsen, call/follow-up at clinic or go to ER

## 2019-11-12 NOTE — PATIENT INSTRUCTIONS
Colonoscopy     A camera attached to a flexible tube with a viewing lens is used to take video pictures.     Colonoscopy is a test to view the inside of your lower digestive tract (colon and rectum). Sometimes it can show the last part of the small intestine (ileum). During the test, small pieces of tissue may be removed for testing. This is called a biopsy. Small growths, such as polyps, may also be removed.   Why is colonoscopy done?  The test is done to help look for colon cancer. And it can help find the source of abdominal pain, bleeding, and changes in bowel habits. It may be needed once a year, depending on factors such as your:  · Age  · Health history  · Family health history  · Symptoms  · Results from any prior colonoscopy  Risks and possible complications  These include:  · Bleeding               · A puncture or tear in the colon   · Risks of anesthesia  · A cancer lesion not being seen  Getting ready   To prepare for the test:  · Talk with your healthcare provider about the risks of the test (see below). Also ask your healthcare provider about alternatives to the test.  · Tell your healthcare provider about any medicines you take. Also tell him or her about any health conditions you may have.  · Make sure your rectum and colon are empty for the test. Follow the diet and bowel prep instructions exactly. If you dont, the test may need to be rescheduled.  · Plan for a friend or family member to drive you home after the test.     Colonoscopy provides an inside view of the entire colon.     You may discuss the results with your doctor right away or at a future visit.  During the test   The test is usually done in the hospital on an outpatient basis. This means you go home the same day. The procedure takes about 30 minutes. During that time:  · You are given relaxing (sedating) medicine through an IV line. You may be drowsy, or fully asleep.  · The healthcare provider will first give you a physical exam to  check for anal and rectal problems.  · Then the anus is lubricated and the scope inserted.  · If you are awake, you may have a feeling similar to needing to have a bowel movement. You may also feel pressure as air is pumped into the colon. Its OK to pass gas during the procedure.  · Biopsy, polyp removal, or other treatments may be done during the test.  After the test   You may have gas right after the test. It can help to try to pass it to help prevent later bloating. Your healthcare provider may discuss the results with you right away. Or you may need to schedule a follow-up visit to talk about the results. After the test, you can go back to your normal eating and other activities. You may be tired from the sedation and need to rest for a few hours.  Date Last Reviewed: 11/1/2016 © 2000-2017 [employer]. [address] [place] [address], [place] [address]. All rights reserved. This information is not intended as a substitute for professional medical care. Always follow your healthcare professional's instructions.

## 2020-01-02 ENCOUNTER — ANESTHESIA EVENT (OUTPATIENT)
Dept: ENDOSCOPY | Facility: HOSPITAL | Age: 75
End: 2020-01-02
Payer: MEDICARE

## 2020-01-02 ENCOUNTER — HOSPITAL ENCOUNTER (OUTPATIENT)
Facility: HOSPITAL | Age: 75
Discharge: HOME | End: 2020-01-02
Attending: INTERNAL MEDICINE | Admitting: INTERNAL MEDICINE
Payer: MEDICARE

## 2020-01-02 ENCOUNTER — ANESTHESIA (OUTPATIENT)
Dept: ENDOSCOPY | Facility: HOSPITAL | Age: 75
End: 2020-01-02
Payer: MEDICARE

## 2020-01-02 DIAGNOSIS — R19.5 POSITIVE FIT (FECAL IMMUNOCHEMICAL TEST): ICD-10-CM

## 2020-01-02 PROCEDURE — 37000008 HC ANESTHESIA 1ST 15 MINUTES: Mod: HCNC,PO | Performed by: INTERNAL MEDICINE

## 2020-01-02 PROCEDURE — 63600175 PHARM REV CODE 636 W HCPCS: Mod: HCNC,PO | Performed by: INTERNAL MEDICINE

## 2020-01-02 PROCEDURE — D9220A PRA ANESTHESIA: ICD-10-PCS | Mod: HCNC,ANES,, | Performed by: ANESTHESIOLOGY

## 2020-01-02 PROCEDURE — 45385 COLONOSCOPY W/LESION REMOVAL: CPT | Mod: HCNC,,, | Performed by: INTERNAL MEDICINE

## 2020-01-02 PROCEDURE — 37000009 HC ANESTHESIA EA ADD 15 MINS: Mod: HCNC,PO | Performed by: INTERNAL MEDICINE

## 2020-01-02 PROCEDURE — 45385 COLONOSCOPY W/LESION REMOVAL: CPT | Mod: HCNC,PO | Performed by: INTERNAL MEDICINE

## 2020-01-02 PROCEDURE — D9220A PRA ANESTHESIA: ICD-10-PCS | Mod: HCNC,CRNA,, | Performed by: NURSE ANESTHETIST, CERTIFIED REGISTERED

## 2020-01-02 PROCEDURE — 63600175 PHARM REV CODE 636 W HCPCS: Mod: HCNC,PO | Performed by: NURSE ANESTHETIST, CERTIFIED REGISTERED

## 2020-01-02 PROCEDURE — 27201089 HC SNARE, DISP (ANY): Mod: HCNC,PO | Performed by: INTERNAL MEDICINE

## 2020-01-02 PROCEDURE — 45385 PR COLONOSCOPY,REMV LESN,SNARE: ICD-10-PCS | Mod: HCNC,,, | Performed by: INTERNAL MEDICINE

## 2020-01-02 PROCEDURE — D9220A PRA ANESTHESIA: Mod: HCNC,CRNA,, | Performed by: NURSE ANESTHETIST, CERTIFIED REGISTERED

## 2020-01-02 PROCEDURE — 88305 TISSUE EXAM BY PATHOLOGIST: ICD-10-PCS | Mod: 26,HCNC,, | Performed by: PATHOLOGY

## 2020-01-02 PROCEDURE — 88305 TISSUE EXAM BY PATHOLOGIST: CPT | Mod: 26,HCNC,, | Performed by: PATHOLOGY

## 2020-01-02 PROCEDURE — D9220A PRA ANESTHESIA: Mod: HCNC,ANES,, | Performed by: ANESTHESIOLOGY

## 2020-01-02 PROCEDURE — 88305 TISSUE EXAM BY PATHOLOGIST: CPT | Mod: 59,HCNC | Performed by: PATHOLOGY

## 2020-01-02 RX ORDER — SODIUM CHLORIDE 0.9 % (FLUSH) 0.9 %
10 SYRINGE (ML) INJECTION
Status: DISCONTINUED | OUTPATIENT
Start: 2020-01-02 | End: 2020-01-02 | Stop reason: HOSPADM

## 2020-01-02 RX ORDER — PROPOFOL 10 MG/ML
VIAL (ML) INTRAVENOUS
Status: DISCONTINUED | OUTPATIENT
Start: 2020-01-02 | End: 2020-01-02

## 2020-01-02 RX ORDER — SODIUM CHLORIDE, SODIUM LACTATE, POTASSIUM CHLORIDE, CALCIUM CHLORIDE 600; 310; 30; 20 MG/100ML; MG/100ML; MG/100ML; MG/100ML
INJECTION, SOLUTION INTRAVENOUS CONTINUOUS
Status: DISCONTINUED | OUTPATIENT
Start: 2020-01-02 | End: 2020-01-02 | Stop reason: HOSPADM

## 2020-01-02 RX ORDER — LIDOCAINE HCL/PF 100 MG/5ML
SYRINGE (ML) INTRAVENOUS
Status: DISCONTINUED | OUTPATIENT
Start: 2020-01-02 | End: 2020-01-02

## 2020-01-02 RX ADMIN — PROPOFOL 25 MG: 10 INJECTION, EMULSION INTRAVENOUS at 09:01

## 2020-01-02 RX ADMIN — PROPOFOL 25 MG: 10 INJECTION, EMULSION INTRAVENOUS at 08:01

## 2020-01-02 RX ADMIN — SODIUM CHLORIDE, SODIUM LACTATE, POTASSIUM CHLORIDE, AND CALCIUM CHLORIDE: .6; .31; .03; .02 INJECTION, SOLUTION INTRAVENOUS at 08:01

## 2020-01-02 RX ADMIN — PROPOFOL 50 MG: 10 INJECTION, EMULSION INTRAVENOUS at 08:01

## 2020-01-02 RX ADMIN — LIDOCAINE HYDROCHLORIDE 75 MG: 20 INJECTION, SOLUTION INTRAVENOUS at 08:01

## 2020-01-02 RX ADMIN — PROPOFOL 125 MG: 10 INJECTION, EMULSION INTRAVENOUS at 08:01

## 2020-01-02 NOTE — H&P
History & Physical - Short Stay  Gastroenterology      SUBJECTIVE:     Procedure: Gastroscopy    Chief Complaint/Indication for Procedure:  Positive FIT.    History of Present Illness:  Office Visit     11/12/2019  [facility] - Gastroenterology   No Resendez NP   Gastroenterology   Positive FIT (fecal immunochemical test) +4 more   Dx   Other     Reason for Visit        Progress Notes        Subjective:       Patient ID: Sofiya Zarate is a 74 y.o. female, Body mass index is 32.05 kg/m².     Chief Complaint: Other (Abnormal fecal test)        Patient is new to me. Established patient of Dr. Lee.     Here for positive fit. Last c-scope in 2007, unremarkable. Denies any overt signs of bleeding.     Constipation   This is a chronic problem. The current episode started more than 1 year ago. The problem is unchanged. Her stool frequency is 1 time per day. The stool is described as firm and formed. The patient is on a high fiber diet. She does not exercise regularly. There has been adequate water intake. Pertinent negatives include no abdominal pain, bloating, diarrhea, difficulty urinating, fever, hematochezia, melena, nausea, vomiting or weight loss. Risk factors include obesity. She has tried laxatives (Currently: Dulcolax otc daily) for the symptoms. The treatment provided significant relief. Her past medical history is significant for abdominal surgery (appendectomy) and endocrine disease. There is no history of inflammatory bowel disease or irritable bowel syndrome. (Hx of GERD and dysphagia; GERD well controlled on Protonix 40 mg once daily; dysphagia improved on recent esophageal dilation)     Assessment:       1. Positive FIT (fecal immunochemical test)    2. Chronic constipation    3. Gastroesophageal reflux disease without esophagitis    4. Esophageal stenosis    5. History of gastritis           Plan:   All diagnoses and orders for this visit:     Positive FIT (fecal immunochemical test)              -  Schedule Colonoscopy, discussed procedure with the patient, including risks and benefits, patient verbalized understanding     Chronic constipation              - Recommend daily exercise as tolerated, adequate water intake (six 8-oz glasses of water daily), and high fiber diet. OTC fiber supplements are recommended if diet does not reach daily fiber goal (20-30 grams daily), such as Metamucil, Citrucel, or FiberCon (take as directed, separate from other oral medications by >2 hours).              - Recommend taking an OTC stool softener such as Colace as directed to avoid hard stools and straining with bowel movements PRN              - Recommend trying OTC MiraLax once daily (17g PO) as directed              - If no improvement with above recommendations, try intermittently dosed Dulcolax OTC as directed (every 3-4  days) PRN to facilitate bowel movements     Gastroesophageal reflux disease without esophagitis & History of gastritis              - Continue Protonix 40 mg once daily              - Take PPI in the morning 30-60 minutes before breakfast              - Educated patient on lifestyle modifications to help control/reduce reflux/abdominal pain including: avoid large meals, avoid eating within 2-3 hours of bedtime (avoid late night eating & lying down soon after eating), elevate head of bed if nocturnal symptoms are present, smoking cessation (if current smoker), & weight loss (if overweight).               - Educated to avoid known foods which trigger reflux symptoms & to minimize/avoid high-fat foods, chocolate, caffeine, citrus, alcohol, & tomato products.              - Advised to avoid/limit use of NSAID's, since they can cause GI upset, bleeding, and/or ulcers. If needed, take with food.      Esophageal stenosis              - Resolved              - EGD PRN for retreatment                 PTA Medications   Medication Sig    aspirin (ECOTRIN) 81 MG EC tablet Take 81 mg by mouth once daily.      atorvastatin (LIPITOR) 20 MG tablet Take 1 tablet (20 mg total) by mouth once daily.    b complex vitamins tablet Take 1 tablet by mouth once daily.    bisacodyl (DULCOLAX) 5 mg EC tablet Take 5 mg by mouth daily as needed for Constipation.    CHOLECALCIFEROL, VITAMIN D3, (VITAMIN D3 ORAL) Take 5,000 mcg by mouth once daily.    ibuprofen (ADVIL,MOTRIN) 100 mg/5 mL suspension Take 200 mg by mouth every 6 (six) hours as needed for Temperature greater than.    levothyroxine (SYNTHROID) 25 MCG tablet Take 1 tablet (25 mcg total) by mouth once daily.    metoprolol tartrate (LOPRESSOR) 50 MG tablet TAKE ONE-HALF TABLET BY MOUTH TWICE DAILY    naproxen sodium (ALEVE) 220 mg Cap Take by mouth daily as needed.    pantoprazole (PROTONIX) 40 MG tablet Take 1 tablet (40 mg total) by mouth before breakfast.    potassium 99 mg Tab Take by mouth nightly.    TURMERIC ORAL Take 500 Devices by mouth once daily.       Review of patient's allergies indicates:  No Known Allergies     Past Medical History:   Diagnosis Date    Anticoagulant long-term use     Coronary artery disease     1 coronary stent    DJD (degenerative joint disease) of knee     Encounter for blood transfusion     post op    GERD (gastroesophageal reflux disease)     Hyperlipidemia     Hypertension     Hypothyroidism     Lumbar disc disease     PAD (peripheral artery disease)      Past Surgical History:   Procedure Laterality Date    APPENDECTOMY      CARDIAC SURGERY      stent x 10yrs    CELIAC ARTERY STENT      COLONOSCOPY  07/20/2007    GUARISCO.   Small internal hemorrhoids, otherwise normal colon.  Repeat 10 yrs.    CORONARY ANGIOPLASTY WITH STENT PLACEMENT      1 stent    shirley      ESOPHAGOGASTRODUODENOSCOPY      ESOPHAGOGASTRODUODENOSCOPY N/A 10/16/2019    Procedure: EGD (ESOPHAGOGASTRODUODENOSCOPY);  Surgeon: Alvarado Lee Jr., MD;  Location: [facility];  Service: Endoscopy;  Laterality: N/A;    TOTAL KNEE ARTHROPLASTY Left      "TUBAL LIGATION      UPPER GASTROINTESTINAL ENDOSCOPY  10/16/2019    Dr. Lee; esophageal stenosis, dilated; gastritis; bx negative     Family History   Problem Relation Age of Onset    Hypertension Mother     Osteoporosis Mother     Heart disease Father     Heart attack Father     Heart disease Sister     Diabetes Sister     Clotting disorder Maternal Uncle     Stroke Maternal Grandmother     Heart disease Brother     Diabetes Brother     Alzheimer's disease Maternal Aunt     Colon cancer Neg Hx      Social History     Tobacco Use    Smoking status: Never Smoker    Smokeless tobacco: Never Used   Substance Use Topics    Alcohol use: No    Drug use: No         OBJECTIVE:     Vital Signs (Most Recent)  Temp: 97 °F (36.1 °C) (01/02/20 0833)  Pulse: 65 (01/02/20 0833)  Resp: 18 (01/02/20 0833)  BP: 136/67 (01/02/20 0833)  SpO2: 95 % (01/02/20 0833)    Physical Exam:  :Ht 5' 4" (1.626 m)   Wt 84.7 kg (186 lb 11.7 oz)   BMI 32.05 kg/m²                                                        GENERAL:  Comfortable, in no acute distress.                                 HEENT EXAM:  Nonicteric.  No adenopathy.  Oropharynx is clear.               NECK:  Supple.                                                               LUNGS:  Clear.                                                               CARDIAC:  Regular rate and rhythm.  S1, S2.  No murmur.                      ABDOMEN:  Soft, positive bowel sounds, nontender.  No hepatosplenomegaly or masses.  No rebound or guarding.                                             EXTREMITIES:  No edema.     MENTAL STATUS:  Alert and oriented.    ASSESSMENT/PLAN:     Assessment: Positive FIT.    Plan: Gastroscopy    Anesthesia Plan:   MAC / General Anaesthesia    ASA Grade: ASA 2 - Patient with mild systemic disease with no functional limitations    MALLAMPATI SCORE: II (hard and soft palate, upper portion of tonsils anduvula visible)    "

## 2020-01-02 NOTE — ANESTHESIA PREPROCEDURE EVALUATION
01/02/2020  Sofiya Zarate is a 74 y.o., female.    Anesthesia Evaluation    I have reviewed the Patient Summary Reports.    I have reviewed the Nursing Notes.      Review of Systems  Cardiovascular:   Hypertension, well controlled CAD asymptomatic CABG/stent  CAD.  Stent in place.  Stable   Hepatic/GI:   GERD, well controlled    Endocrine:   Hypothyroidism        Physical Exam  General:  Obesity    Airway/Jaw/Neck:  Airway Findings: Mallampati: II                Anesthesia Plan  Type of Anesthesia, risks & benefits discussed:  Anesthesia Type:  general  Patient's Preference:   Intra-op Monitoring Plan:   Intra-op Monitoring Plan Comments:   Post Op Pain Control Plan:   Post Op Pain Control Plan Comments:   Induction:   IV  Beta Blocker:  Patient is not currently on a Beta-Blocker (No further documentation required).       Informed Consent: Patient understands risks and agrees with Anesthesia plan.  Questions answered. Anesthesia consent signed with patient.  ASA Score: 3     Day of Surgery Review of History & Physical:    H&P update referred to the surgeon.         Ready For Surgery From Anesthesia Perspective.

## 2020-01-02 NOTE — TRANSFER OF CARE
"Anesthesia Transfer of Care Note    Patient: Sofiya Zarate    Procedure(s) Performed: Procedure(s) (LRB):  COLONOSCOPY (N/A)    Patient location: PACU    Anesthesia Type: general    Transport from OR: Transported from OR on room air with adequate spontaneous ventilation    Post pain: adequate analgesia    Post assessment: no apparent anesthetic complications and tolerated procedure well    Post vital signs: stable    Level of consciousness: awake    Nausea/Vomiting: no nausea/vomiting    Complications: none    Transfer of care protocol was followed      Last vitals:   Visit Vitals  /67   Pulse 65   Temp 36.1 °C (97 °F) (Skin)   Resp 18   Ht 5' 4" (1.626 m)   Wt 83.9 kg (185 lb)   SpO2 95%   BMI 31.76 kg/m²     "

## 2020-01-02 NOTE — BRIEF OP NOTE
Discharge Note  Short Stay      SUMMARY     Admit Date: 1/2/2020    Attending Physician: Alvarado Lee Jr., MD     Discharge Physician: Alvarado Lee Jr., MD    Discharge Date: 1/2/2020 9:27 AM    Final Diagnosis: Positive FIT (fecal immunochemical test) [R19.5]  Chronic constipation [K59.09]  Impression:          - Two 1 to 2 mm polyps at the hepatic flexure,                        removed with a cold snare. Resected and retrieved.                       - One 2 to 3 mm polyp in the mid descending colon,                        removed with a cold snare. Resected and retrieved.                       - Diverticulosis in the proximal sigmoid colon.                       - Non-bleeding internal hemorrhoids.                       - The examination was otherwise normal.                       - The examined portion of the ileum was normal.  Recommendation:      - Await pathology results.                       - High fiber diet.                       - Use fiber, for example Citrucel, Fibercon, Konsyl                        or Metamucil.                       - Take a PROBIOTIC, such as a carton of GREEK YOGURT                        (Chobani or Oikos, or Activia or Dannon); or tablets                        of ALIGN or CULTURELLE or DANIELA-Q (all                        non-prescription), every day for a month.                       - Miralax 1 capful (17 grams) in 8 ounces of water                        PO daily PRN.                       - Call the [facility] in 2 weeks for reports (if                        you haven't heard from us sooner) [phone].                       - Repeat colonoscopy in 5 years for surveillance.                       - Continue present medications.                       - Patient has a contact number available for                        emergencies. The signs and symptoms of potential                        delayed complications were discussed with the                        patient.  Return to normal activities tomorrow.                        Written discharge instructions were provided to the                        patient.                       - Return to normal activities tomorrow.  Alvarado Lee MD  1/2/2020  Disposition: HOME OR SELF CARE    Patient Instructions:   Current Discharge Medication List      CONTINUE these medications which have NOT CHANGED    Details   aspirin (ECOTRIN) 81 MG EC tablet Take 81 mg by mouth once daily.       atorvastatin (LIPITOR) 20 MG tablet Take 1 tablet (20 mg total) by mouth once daily.  Qty: 90 tablet, Refills: 3    Associated Diagnoses: Hyperlipidemia, unspecified hyperlipidemia type      b complex vitamins tablet Take 1 tablet by mouth once daily.      bisacodyl (DULCOLAX) 5 mg EC tablet Take 5 mg by mouth daily as needed for Constipation.      CHOLECALCIFEROL, VITAMIN D3, (VITAMIN D3 ORAL) Take 5,000 mcg by mouth once daily.      ibuprofen (ADVIL,MOTRIN) 100 mg/5 mL suspension Take 200 mg by mouth every 6 (six) hours as needed for Temperature greater than.      levothyroxine (SYNTHROID) 25 MCG tablet Take 1 tablet (25 mcg total) by mouth once daily.  Qty: 90 tablet, Refills: 1    Associated Diagnoses: Hypothyroidism, unspecified type      metoprolol tartrate (LOPRESSOR) 50 MG tablet TAKE ONE-HALF TABLET BY MOUTH TWICE DAILY  Qty: 90 tablet, Refills: 3      naproxen sodium (ALEVE) 220 mg Cap Take by mouth daily as needed.      pantoprazole (PROTONIX) 40 MG tablet Take 1 tablet (40 mg total) by mouth before breakfast.  Qty: 90 tablet, Refills: 3    Associated Diagnoses: Esophageal dysphagia      potassium 99 mg Tab Take by mouth nightly.      TURMERIC ORAL Take 500 Devices by mouth once daily.             Discharge Procedure Orders (must include Diet, Follow-up, Activity)    Follow Up:  Follow up with PCP as per your routine.  Please follow a high fiber diet.  Activity as tolerated.    No driving day of procedure.    PROBIOTICS:  Now that your  colon is so cleaned out, now is a good time for a round of PROBIOTICS.  Eat a container of Greek Yogurt, such as OIKOS or CHOBANI,  Or Activia or Dannon    Greek Yogurt.    Or Take a similar Probiotic product such as Align or Culturelle or Lorenza-Q, every day for a month.                  (The products listed are non-prescription, but you may need to ask the pharmacist for their location.)  Repeat this at least 5-6 times a year.

## 2020-01-02 NOTE — OR NURSING
Personal belonging bag placed under stretcher with cell phone and purse per patient request, no family present

## 2020-01-02 NOTE — OR NURSING
Patient in preop, Dr. Contreras, anesthesia at bedside for conscent, patient with no questions.  Dr. Lee at bedside for conscent, patient with no questions.

## 2020-01-02 NOTE — ANESTHESIA POSTPROCEDURE EVALUATION
Anesthesia Post Evaluation    Patient: Sofiya Zarate    Procedure(s) Performed: Procedure(s) (LRB):  COLONOSCOPY (N/A)    Final Anesthesia Type: general    Patient location during evaluation: PACU  Patient participation: Yes- Able to Participate  Level of consciousness: awake and alert  Post-procedure vital signs: reviewed and stable  Pain management: adequate  Airway patency: patent    PONV status at discharge: No PONV  Anesthetic complications: no      Cardiovascular status: blood pressure returned to baseline and hemodynamically stable  Respiratory status: unassisted  Hydration status: euvolemic  Follow-up not needed.          Vitals Value Taken Time   /77 1/2/2020  9:34 AM   Temp 36.3 °C (97.3 °F) 1/2/2020  9:06 AM   Pulse 69 1/2/2020  9:34 AM   Resp 12 1/2/2020  9:34 AM   SpO2 95 % 1/2/2020  9:34 AM         Event Time     Out of Recovery 09:39:01          Pain/Gay Score: Gay Score: 10 (1/2/2020  9:35 AM)

## 2020-01-02 NOTE — DISCHARGE INSTRUCTIONS
Recovery After Procedural Sedation (Adult)  You have been given medicine by vein to make you sleep during your surgery. This may have included both a pain medicine and sleeping medicine. Most of the effects have worn off. But you may still have some drowsiness for the next 6 to 8 hours.  Home care  Follow these guidelines when you get home:  · For the next 8 hours, you should be watched by a responsible adult. This person should make sure your condition is not getting worse.  · Don't drink any alcohol for the next 24 hours.  · Don't drive, operate dangerous machinery, or make important business or personal decisions during the next 24 hours.  Note: Your healthcare provider may tell you not to take any medicine by mouth for pain or sleep in the next 4 hours. These medicines may react with the medicines you were given in the hospital. This could cause a much stronger response than usual.  Follow-up care  Follow up with your healthcare provider if you are not alert and back to your usual level of activity within 12 hours.  When to seek medical advice  Call your healthcare provider right away if any of these occur:  · Drowsiness gets worse  · Weakness or dizziness gets worse  · Repeated vomiting  · You can't be awakened   Date Last Reviewed: 10/18/2016  © 5478-6404 [employer]. [address] [place] [address], [place] [address]. All rights reserved. This information is not intended as a substitute for professional medical care. Always follow your healthcare professional's instructions.      PROBIOTICS:  Now that your colon is so cleaned out, now is a good time for a round of PROBIOTICS.  Eat a container of Greek Yogurt, such as OIKOS or CHOBANI,  Or Activia or Dannon    Greek Yogurt.    Or Take a similar Probiotic product such as Align or Culturelle or Lorenza-Q, every day for a month.                  (The products listed are non-prescription, but you may need to ask the pharmacist for their location.)  Repeat  this at least 5-6 times a year.      High-Fiber Diet  Fiber is in fruits, vegetables, cereals, and grains. Fiber passes through your body undigested. A high-fiber diet helps food move through your intestinal tract. The added bulk is helpful in preventing constipation. In people with diverticulosis, fiber helps clean out the pouches along the colon wall. It also prevents new pouches from forming. A high-fiber diet reduces the risk of colon cancer. It also lowers blood cholesterol and prevents high blood sugar in people with diabetes.    The fiber-rich foods listed below should be part of your diet. If you are not used to high-fiber foods, start with 1 or 2 foods from this list. Every 3 to 4 days add a new one to your diet. Do this until you are eating 4 high-fiber foods per day. This should give you 20 to 35 grams of fiber a day. It is also important to drink a lot of water when you are on this diet. You should have 6 to 8 glasses of water a day. Water makes the fiber swell and increases the benefit.  Foods high in dietary fiber  The following foods are high in dietary fiber:  · Breads. Breads made with 100% whole-wheat flour; devon, wheat, or rye crackers; whole-grain tortillas, bran muffins.  · Cereals. Whole-grain and bran cereals with bran (shredded wheat, wheat flakes, raisin bran, corn bran); oatmeal, rolled oats, granola, and brown rice.  · Fruits. Fresh fruits and their edible skins (pears, prunes, raisins, berries, apples, and apricots); bananas, citrus fruit, mangoes, pineapple; and prune juice.  · Nuts. Any nuts and seeds.  · Vegetables. Best served raw or lightly cooked. All types, especially: green peas, celery, eggplant, potatoes, spinach, broccoli, [place] sprouts, winter squash, carrots, cauliflower, soybeans, lentils, and fresh and dried beans of all kinds.  · Other. Popcorn, any spices.  Date Last Reviewed: 8/1/2016  © 9951-7821 [employer]. [address] [place] [address], [place] [address].  All rights reserved. This information is not intended as a substitute for professional medical care. Always follow your healthcare professional's instructions.

## 2020-01-02 NOTE — [employer] PATIENT INSTRUCTIONS
Discharge Summary/Instructions for after Colonoscopy with   Biopsy/Polypectomy  Sofiya Zarate    Thursday, January 02, 2020  Alvarado Lee MD  RESTRICTIONS ON ACTIVITY:  - Do not drive a car or operate machinery until the day after the procedure.      - The following day: return to full activity including work.  - For  3 days: No heavy lifting, straining or running.  - Diet: You can have solid foods, but no gassy foods (i.e. beans, broccoli,   cabbage, etc).  TREATMENT FOR COMMON SIDE EFFECTS:  - Mild abdominal pain and bloating or excessive gas: rest, eat lightly and   use a heating pad.  SYMPTOMS TO WATCH FOR AND REPORT TO YOUR PHYSICIAN:  1. Severe abdominal pain.  2. Fever within 24 hours after a procedure.  3. A large amount of rectal bleeding. (A small amount of blood from the   rectum is not serious, especially if hemorrhoids are present.  3.  Because air was put into your colon during the procedure, expelling   large amounts of air from your rectum is normal.  4.  You may not have a bowel movement for 1-3 days because of the   colonoscopy prep.  This is normal.  5.  Call immediately if you notice any of the following:   Chills and/or fever over 101   Persistent vomiting   Severe abdominal pain, other than gas cramps   Severe chest pain   Black, tarry stools   Any bleeding - exceeding one tablespoon  Your doctor recommends these additional instructions:  We are waiting for your pathology results.   Eat a high fiber diet.   Take a fiber supplement, for example Citrucel, Fibercon, Konsyl or   Metamucil.   Take a PROBIOTIC, such as a carton of GREEK YOGURT (Chobani or Oikos,  or   Activia or Dannon);  or tablets of ALIGN or CULTURELLE or DANIELA-Q (all   non-prescription), every day for a month.   And repeat this 3-4 times a   year.  Take Miralax 1 capful (17 grams) in 8 ounces of water by mouth once a day as   needed.   Your physician has recommended a repeat colonoscopy in 5 years for    surveillance.  None  If you have any questions or problems, please call your physician.  EMERGENCY PHONE NUMBER: [phone]  LAB RESULTS: Call in two (2) weeks for lab results, [phone]  ___________________________________________  Nurse Signature  ___________________________________________  Patient/Designated Responsible Party Signature  Alvarado Lee MD  1/2/2020 9:24:18 AM  This report has been verified and signed electronically.  [employer]

## 2020-01-03 VITALS
SYSTOLIC BLOOD PRESSURE: 143 MMHG | TEMPERATURE: 97 F | OXYGEN SATURATION: 95 % | HEART RATE: 69 BPM | RESPIRATION RATE: 12 BRPM | DIASTOLIC BLOOD PRESSURE: 77 MMHG | HEIGHT: 64 IN | BODY MASS INDEX: 31.58 KG/M2 | WEIGHT: 185 LBS

## 2020-01-10 LAB
FINAL PATHOLOGIC DIAGNOSIS: NORMAL
GROSS: NORMAL

## 2020-04-13 RX ORDER — METOPROLOL TARTRATE 50 MG/1
TABLET ORAL
Qty: 90 TABLET | Refills: 3 | Status: SHIPPED | OUTPATIENT
Start: 2020-04-13 | End: 2021-01-13

## 2020-05-07 ENCOUNTER — PES CALL (OUTPATIENT)
Dept: ADMINISTRATIVE | Facility: CLINIC | Age: 75
End: 2020-05-07

## 2020-08-12 ENCOUNTER — PES CALL (OUTPATIENT)
Dept: ADMINISTRATIVE | Facility: CLINIC | Age: 75
End: 2020-08-12

## 2021-01-12 ENCOUNTER — IMMUNIZATION (OUTPATIENT)
Dept: FAMILY MEDICINE | Facility: CLINIC | Age: 76
End: 2021-01-12
Payer: MEDICARE

## 2021-01-12 DIAGNOSIS — Z23 NEED FOR VACCINATION: ICD-10-CM

## 2021-01-12 PROCEDURE — 91300 COVID-19, MRNA, LNP-S, PF, 30 MCG/0.3 ML DOSE VACCINE: CPT | Mod: PBBFAC | Performed by: FAMILY MEDICINE

## 2021-02-02 ENCOUNTER — IMMUNIZATION (OUTPATIENT)
Dept: FAMILY MEDICINE | Facility: CLINIC | Age: 76
End: 2021-02-02
Payer: MEDICARE

## 2021-02-02 DIAGNOSIS — Z23 NEED FOR VACCINATION: Primary | ICD-10-CM

## 2021-02-02 PROCEDURE — 91300 COVID-19, MRNA, LNP-S, PF, 30 MCG/0.3 ML DOSE VACCINE: CPT | Mod: PBBFAC | Performed by: FAMILY MEDICINE

## 2021-02-02 PROCEDURE — 0002A COVID-19, MRNA, LNP-S, PF, 30 MCG/0.3 ML DOSE VACCINE: CPT | Mod: PBBFAC | Performed by: FAMILY MEDICINE

## 2021-03-09 PROBLEM — E66.01 MORBID OBESITY: Status: ACTIVE | Noted: 2021-03-09

## 2021-11-15 PROBLEM — M12.811 RIGHT ROTATOR CUFF TEAR ARTHROPATHY: Status: ACTIVE | Noted: 2021-11-15

## 2021-11-15 PROBLEM — M75.101 RIGHT ROTATOR CUFF TEAR ARTHROPATHY: Status: ACTIVE | Noted: 2021-11-15

## 2021-12-13 ENCOUNTER — OFFICE VISIT (OUTPATIENT)
Dept: CARDIOLOGY | Facility: CLINIC | Age: 76
End: 2021-12-13
Payer: MEDICARE

## 2021-12-13 VITALS
BODY MASS INDEX: 31.1 KG/M2 | HEIGHT: 64 IN | WEIGHT: 182.19 LBS | HEART RATE: 58 BPM | DIASTOLIC BLOOD PRESSURE: 79 MMHG | SYSTOLIC BLOOD PRESSURE: 163 MMHG

## 2021-12-13 DIAGNOSIS — I25.10 CORONARY ARTERY DISEASE INVOLVING NATIVE CORONARY ARTERY OF NATIVE HEART WITHOUT ANGINA PECTORIS: Primary | ICD-10-CM

## 2021-12-13 DIAGNOSIS — Z01.810 PREOP CARDIOVASCULAR EXAM: ICD-10-CM

## 2021-12-13 DIAGNOSIS — I10 PRIMARY HYPERTENSION: ICD-10-CM

## 2021-12-13 DIAGNOSIS — E78.2 MIXED HYPERLIPIDEMIA: ICD-10-CM

## 2021-12-13 DIAGNOSIS — E66.01 MORBID OBESITY: ICD-10-CM

## 2021-12-13 PROCEDURE — 99214 OFFICE O/P EST MOD 30 MIN: CPT | Mod: S$GLB,,, | Performed by: INTERNAL MEDICINE

## 2021-12-13 PROCEDURE — 93005 ELECTROCARDIOGRAM TRACING: CPT | Mod: PO

## 2021-12-13 PROCEDURE — 99999 PR PBB SHADOW E&M-EST. PATIENT-LVL IV: ICD-10-PCS | Mod: PBBFAC,,, | Performed by: INTERNAL MEDICINE

## 2021-12-13 PROCEDURE — 99214 PR OFFICE/OUTPT VISIT, EST, LEVL IV, 30-39 MIN: ICD-10-PCS | Mod: S$GLB,,, | Performed by: INTERNAL MEDICINE

## 2021-12-13 PROCEDURE — 93010 EKG 12-LEAD: ICD-10-PCS | Mod: S$GLB,,, | Performed by: INTERNAL MEDICINE

## 2021-12-13 PROCEDURE — 99999 PR PBB SHADOW E&M-EST. PATIENT-LVL IV: CPT | Mod: PBBFAC,,, | Performed by: INTERNAL MEDICINE

## 2021-12-13 PROCEDURE — 93010 ELECTROCARDIOGRAM REPORT: CPT | Mod: S$GLB,,, | Performed by: INTERNAL MEDICINE

## 2022-10-18 ENCOUNTER — HOSPITAL ENCOUNTER (OUTPATIENT)
Dept: RADIOLOGY | Facility: HOSPITAL | Age: 77
Discharge: HOME | End: 2022-10-18
Attending: FAMILY MEDICINE
Payer: MEDICARE

## 2022-10-18 DIAGNOSIS — Z78.0 ASYMPTOMATIC MENOPAUSAL STATE: ICD-10-CM

## 2022-10-18 PROCEDURE — 77080 DXA BONE DENSITY AXIAL: CPT | Mod: TC,PO

## 2022-10-18 PROCEDURE — 77080 DEXA BONE DENSITY SPINE HIP: ICD-10-PCS | Mod: 26,,, | Performed by: RADIOLOGY

## 2022-10-18 PROCEDURE — 77080 DXA BONE DENSITY AXIAL: CPT | Mod: 26,,, | Performed by: RADIOLOGY

## 2022-10-26 PROBLEM — K76.0 FATTY INFILTRATION OF LIVER: Status: ACTIVE | Noted: 2022-10-26

## 2023-11-22 NOTE — TELEPHONE ENCOUNTER
Informed by RN patient c/o chest pain. Upon arrival patient resting comfortably in bed. States he has mid sternal chest pain when he coughs. Unable to states when this pain began but reports for "some time". Denies radiating pain. States pain is worse when he touches his chest. Denies fever, chills, weakness, palpitations, trouble breathing, abdominal pain, nausea, vomit, diarrhea.     Of note BP 78/32 (as per EMR pt received Entresto and Coreg earlier this AM).    Vital Signs Last 24 Hrs  T(F): 97.9 (22 Nov 2023 10:23), Max: 98 (21 Nov 2023 21:50)  HR: 78 (22 Nov 2023 14:21) (75 - 86)  BP: 110/54 (22 Nov 2023 14:21) (78/38 - 127/46)  RR: 16 (22 Nov 2023 13:18) (15 - 16)  SpO2: 98% (22 Nov 2023 13:18) (98% - 98%)    Parameters below as of 22 Nov 2023 10:23  Patient On (Oxygen Delivery Method): room air    PHYSICAL EXAM:  GENERAL: NAD, well-developed  HEAD: atraumatic, normocephalic  EYES: EOMI, PERRLA  NECK: supple, no JVD  CHEST/LUNG: clear to auscultation bilaterally; no wheezing/rhonchi/rales  HEART: regular rate and rhythm; no murmurs, rubs, or gallops (+) Reproducible chest wall tenderness mid chest.   ABDOMEN: soft, nontender,   EXTREMITIES:  2+ peripheral pulses; no clubbing, cyanosis, or edema    - EKG performed, unchanged from prior. Reviewed with Attending  - Cardiac enzymes ordered CKMB and CPK wnl, troponin elevated 625 however less than prior 1184  - 250 cc Normal Saline ordered for hypotension -> rpt /60, BP medications adjusted  - Vital signs Q4   - Continue to monitor closely     Discussed with Dr Solomon Aviles PA-C,   Internal Medicine ACP   In house pager #[phone] Scheduled US

## 2024-01-17 DIAGNOSIS — I25.10 CORONARY ARTERY DISEASE INVOLVING NATIVE CORONARY ARTERY OF NATIVE HEART WITHOUT ANGINA PECTORIS: Primary | Chronic | ICD-10-CM

## 2024-01-17 RX ORDER — METOPROLOL TARTRATE 50 MG/1
TABLET ORAL
Qty: 90 TABLET | Refills: 3 | Status: SHIPPED | OUTPATIENT
Start: 2024-01-17

## 2024-05-02 ENCOUNTER — TELEPHONE (OUTPATIENT)
Dept: CARDIOLOGY | Facility: CLINIC | Age: 79
End: 2024-05-02
Payer: MEDICARE

## 2024-05-03 ENCOUNTER — OFFICE VISIT (OUTPATIENT)
Dept: CARDIOLOGY | Facility: CLINIC | Age: 79
End: 2024-05-03
Payer: MEDICARE

## 2024-05-03 VITALS
HEART RATE: 63 BPM | WEIGHT: 158.06 LBS | HEIGHT: 64 IN | SYSTOLIC BLOOD PRESSURE: 164 MMHG | BODY MASS INDEX: 26.98 KG/M2 | DIASTOLIC BLOOD PRESSURE: 78 MMHG

## 2024-05-03 DIAGNOSIS — Z01.810 PREOP CARDIOVASCULAR EXAM: ICD-10-CM

## 2024-05-03 DIAGNOSIS — I25.10 CORONARY ARTERY DISEASE INVOLVING NATIVE CORONARY ARTERY OF NATIVE HEART WITHOUT ANGINA PECTORIS: Primary | Chronic | ICD-10-CM

## 2024-05-03 DIAGNOSIS — I10 PRIMARY HYPERTENSION: Chronic | ICD-10-CM

## 2024-05-03 DIAGNOSIS — E78.2 MIXED HYPERLIPIDEMIA: ICD-10-CM

## 2024-05-03 PROCEDURE — 1159F MED LIST DOCD IN RCRD: CPT | Mod: CPTII,S$GLB,, | Performed by: INTERNAL MEDICINE

## 2024-05-03 PROCEDURE — 1126F AMNT PAIN NOTED NONE PRSNT: CPT | Mod: CPTII,S$GLB,, | Performed by: INTERNAL MEDICINE

## 2024-05-03 PROCEDURE — 99999 PR PBB SHADOW E&M-EST. PATIENT-LVL III: CPT | Mod: PBBFAC,,, | Performed by: INTERNAL MEDICINE

## 2024-05-03 PROCEDURE — 1101F PT FALLS ASSESS-DOCD LE1/YR: CPT | Mod: CPTII,S$GLB,, | Performed by: INTERNAL MEDICINE

## 2024-05-03 PROCEDURE — 3288F FALL RISK ASSESSMENT DOCD: CPT | Mod: CPTII,S$GLB,, | Performed by: INTERNAL MEDICINE

## 2024-05-03 PROCEDURE — 3077F SYST BP >= 140 MM HG: CPT | Mod: CPTII,S$GLB,, | Performed by: INTERNAL MEDICINE

## 2024-05-03 PROCEDURE — 99214 OFFICE O/P EST MOD 30 MIN: CPT | Mod: S$GLB,,, | Performed by: INTERNAL MEDICINE

## 2024-05-03 PROCEDURE — 1160F RVW MEDS BY RX/DR IN RCRD: CPT | Mod: CPTII,S$GLB,, | Performed by: INTERNAL MEDICINE

## 2024-05-03 PROCEDURE — 3078F DIAST BP <80 MM HG: CPT | Mod: CPTII,S$GLB,, | Performed by: INTERNAL MEDICINE

## 2024-05-03 NOTE — PROGRESS NOTES
Subjective:    Patient ID:  Sofiya Zarate is a 78 y.o. female who presents for follow-up of clearance      HPI  She comes with no complaints, no chest pain, no shortness of breath  Having back injection within the next few weeks  Functional class 1-2    Review of Systems   Constitutional: Negative for decreased appetite, malaise/fatigue, weight gain and weight loss.   Cardiovascular:  Negative for chest pain, dyspnea on exertion, leg swelling, palpitations and syncope.   Respiratory:  Negative for cough and shortness of breath.    Gastrointestinal: Negative.    Neurological:  Negative for weakness.   All other systems reviewed and are negative.       Objective:      Physical Exam  Vitals and nursing note reviewed.   Constitutional:       Appearance: Normal appearance. She is well-developed.   HENT:      Head: Normocephalic.   Eyes:      Pupils: Pupils are equal, round, and reactive to light.   Neck:      Thyroid: No thyromegaly.      Vascular: No carotid bruit or JVD.   Cardiovascular:      Rate and Rhythm: Normal rate and regular rhythm.      Chest Wall: PMI is not displaced.      Pulses: Normal pulses and intact distal pulses.      Heart sounds: Normal heart sounds. No murmur heard.     No gallop.   Pulmonary:      Effort: Pulmonary effort is normal.      Breath sounds: Normal breath sounds.   Abdominal:      Palpations: Abdomen is soft. There is no mass.      Tenderness: There is no abdominal tenderness.   Musculoskeletal:         General: Normal range of motion.      Cervical back: Normal range of motion and neck supple.   Skin:     General: Skin is warm.   Neurological:      Mental Status: She is alert and oriented to person, place, and time.      Sensory: No sensory deficit.      Deep Tendon Reflexes: Reflexes are normal and symmetric.             Most Recent EKG Results  Results for orders placed or performed in visit on 12/13/21   IN OFFICE EKG 12-LEAD (to [place])    Collection Time: 12/13/21  2:02 PM     Narrative    Test Reason : I10    Vent. Rate : 057 BPM     Atrial Rate : 057 BPM     P-R Int : 126 ms          QRS Dur : 084 ms      QT Int : 442 ms       P-R-T Axes : 014 -04 001 degrees     QTc Int : 430 ms    Sinus bradycardia  Nonspecific ST and T wave abnormality  Abnormal ECG  No previous ECGs available  Confirmed by NELL DENNEY MD ([id number]) on 12/15/2021 3:20:32 PM    Referred By: ZITA SILVEIRA           Confirmed By:NELL DENNEY MD       Most Recent Echocardiogram Results  No results found for this or any previous visit.      Most Recent Nuclear Stress Test Results  No results found for this or any previous visit.      Most Recent Cardiac PET Stress Test Results  No results found for this or any previous visit.      Most Recent Cardiovascular Angiogram results  No results found for this or any previous visit.      Other Most Recent Cardiology Results  Results for orders placed during the hospital encounter of 04/28/22    CARDIAC MONITORING STRIPS      Labs reviewed    Assessment:       1. Coronary artery disease involving native coronary artery of native heart without angina pectoris    2. Primary hypertension    3. Preop cardiovascular exam    4. Mixed hyperlipidemia         Plan:   No contraindication for surgery/anesthesia from cardiac standpoint  Okay to hold aspirin 5-7 days before procedure.  Continue:  ASA, Beta blocker, and Statin  Regular exercise program  Weight loss  Low cholesterol diet    Follow-up on a p.r.n. basis

## 2024-05-08 ENCOUNTER — TELEPHONE (OUTPATIENT)
Dept: CARDIOLOGY | Facility: CLINIC | Age: 79
End: 2024-05-08
Payer: MEDICARE

## 2024-05-08 NOTE — TELEPHONE ENCOUNTER
----- Message from Kaycee Resendez sent at 5/8/2024  1:41 PM CDT -----  Contact: pt  Type:  Needs Medical Advice    Who Called: pt    Would the patient rather a call back or a response via [facility]? Call back    Best Call Back Number: [phone]    Additional Information: Dr KARL Bermudez Jr ([facility]) is still waiting on surgical clearance from office    Fax # [phone]    Please fax that over to them so pt can get scheduled    Thanks

## 2024-05-13 ENCOUNTER — TELEPHONE (OUTPATIENT)
Dept: CARDIOLOGY | Facility: CLINIC | Age: 79
End: 2024-05-13
Payer: MEDICARE

## 2024-05-13 NOTE — TELEPHONE ENCOUNTER
----- Message from Sohan Soliman sent at 5/13/2024  3:31 PM CDT -----  Contact: Jamila  Type:  Needs Medical Advice     Who Called: Jamila     Would the patient rather a call back or a response via [facility]? Call back     Best Call Back Number: [phone]     Additional Information: Dr KARL Bermudez Jr ([facility]) is still waiting on surgical clearance from office     Fax # [phone]

## 2024-05-16 ENCOUNTER — TELEPHONE (OUTPATIENT)
Dept: CARDIOLOGY | Facility: CLINIC | Age: 79
End: 2024-05-16
Payer: MEDICARE

## 2024-12-05 ENCOUNTER — TELEPHONE (OUTPATIENT)
Dept: CARDIOLOGY | Facility: CLINIC | Age: 79
End: 2024-12-05
Payer: MEDICARE

## 2024-12-05 NOTE — TELEPHONE ENCOUNTER
Cardiac clearance for Lt URSULA on 1/13. Spinal anesthesia. Pt taking ASA.   Implant: stents    Procedure will be performed @ [facility]  Fax: [phone]

## 2024-12-16 ENCOUNTER — OFFICE VISIT (OUTPATIENT)
Dept: CARDIOLOGY | Facility: CLINIC | Age: 79
End: 2024-12-16
Payer: MEDICARE

## 2024-12-16 ENCOUNTER — TELEPHONE (OUTPATIENT)
Dept: CARDIOLOGY | Facility: CLINIC | Age: 79
End: 2024-12-16

## 2024-12-16 VITALS
DIASTOLIC BLOOD PRESSURE: 79 MMHG | WEIGHT: 169.31 LBS | SYSTOLIC BLOOD PRESSURE: 155 MMHG | HEART RATE: 68 BPM | HEIGHT: 64 IN | BODY MASS INDEX: 28.91 KG/M2

## 2024-12-16 DIAGNOSIS — I70.0 AORTIC ATHEROSCLEROSIS: ICD-10-CM

## 2024-12-16 DIAGNOSIS — I10 PRIMARY HYPERTENSION: Chronic | ICD-10-CM

## 2024-12-16 DIAGNOSIS — I77.9 BILATERAL CAROTID ARTERY DISEASE, UNSPECIFIED TYPE: ICD-10-CM

## 2024-12-16 DIAGNOSIS — Z01.810 PREOP CARDIOVASCULAR EXAM: Primary | ICD-10-CM

## 2024-12-16 DIAGNOSIS — E78.2 MIXED HYPERLIPIDEMIA: ICD-10-CM

## 2024-12-16 DIAGNOSIS — I25.10 CORONARY ARTERY DISEASE INVOLVING NATIVE CORONARY ARTERY OF NATIVE HEART WITHOUT ANGINA PECTORIS: Chronic | ICD-10-CM

## 2024-12-16 LAB
OHS QRS DURATION: 88 MS
OHS QTC CALCULATION: 425 MS

## 2024-12-16 PROCEDURE — 99999 PR PBB SHADOW E&M-EST. PATIENT-LVL III: CPT | Mod: PBBFAC,,,

## 2024-12-16 PROCEDURE — 93005 ELECTROCARDIOGRAM TRACING: CPT | Mod: PO

## 2024-12-16 PROCEDURE — 3077F SYST BP >= 140 MM HG: CPT | Mod: CPTII,S$GLB,,

## 2024-12-16 PROCEDURE — 3078F DIAST BP <80 MM HG: CPT | Mod: CPTII,S$GLB,,

## 2024-12-16 PROCEDURE — 1159F MED LIST DOCD IN RCRD: CPT | Mod: CPTII,S$GLB,,

## 2024-12-16 PROCEDURE — 3288F FALL RISK ASSESSMENT DOCD: CPT | Mod: CPTII,S$GLB,,

## 2024-12-16 PROCEDURE — 1126F AMNT PAIN NOTED NONE PRSNT: CPT | Mod: CPTII,S$GLB,,

## 2024-12-16 PROCEDURE — 99214 OFFICE O/P EST MOD 30 MIN: CPT | Mod: S$GLB,,,

## 2024-12-16 PROCEDURE — 93010 ELECTROCARDIOGRAM REPORT: CPT | Mod: S$GLB,,, | Performed by: INTERNAL MEDICINE

## 2024-12-16 PROCEDURE — 1101F PT FALLS ASSESS-DOCD LE1/YR: CPT | Mod: CPTII,S$GLB,,

## 2024-12-16 NOTE — PROGRESS NOTES
[facility]  [facility]  Date: 12/16/24    Patient: Sofiya Zarate, 1945, [id number]  Primary Care Provider: Matt Carter MD     Chief Complaint: Cardiac clearance     Subjective:       Sofiya Zarate is a 79 y.o. female who presents for cardiac clearance. They follow with Dr. Arreaga.    CMP, lipid panel stable. At last office visit, patient doing well from cardiac standpoint for which medications were continued as is.     Since then, patient without active cardiac complaints. Average SBP 130s at home. Requests cardiac clearance for L hip replacement on 1/13/25 with Dr. Esquivel at [facility]. Denies chest discomfort, dyspnea, palpitations, dizziness, syncope, orthopnea, PND, edema.    Focused Past History includes:  Coronary artery disease  Carotid artery disease  Carotid US 9/2017: minimal homogenous plaque at both carotid bifurcations without significant stenosis   Aortic atherosclerosis   Hypertension  Hyperlipidemia     Current Outpatient Medications   Medication Sig    acetaminophen (TYLENOL) 325 MG tablet Take 2 tablets (650 mg total) by mouth every 6 (six) hours.    aspirin (ECOTRIN) 81 MG EC tablet Take 81 mg by mouth once daily.     atorvastatin (LIPITOR) 80 MG tablet TAKE ONE TABLET BY MOUTH EVERY DAY    b complex vitamins tablet Take 1 tablet by mouth once daily.    esomeprazole (NEXIUM) 20 MG capsule Take 20 mg by mouth before breakfast.    levothyroxine (SYNTHROID) 25 MCG tablet TAKE ONE TABLET BY MOUTH EVERY DAY    magnesium 250 mg Tab Take 1 tablet by mouth every evening.    metoprolol tartrate (LOPRESSOR) 50 MG tablet TAKE ONE-HALF TABLET BY MOUTH TWICE DAILY    naproxen sodium 220 mg Cap Take 1 capsule by mouth 2 (two) times daily as needed (pain (mild to moderate pain)).    sennosides 25 mg Tab Take 1 tablet by mouth nightly as needed (constipation).    zinc gluconate 50 mg tablet Take 50 mg by mouth once daily.     No current facility-administered medications  for this visit.            Objective       Review of Systems  Constitutional: negative for fevers, night sweats, and weight loss  Eyes: negative for visual disturbance, diplopia  Respiratory: negative for cough, hemoptysis, sputum, and wheezing  Cardiovascular: see HPI  Gastrointestinal: negative for abdominal pain, bright red blood per rectum, change in bowel habits, dysphagia, melena, and reflux symptoms  Genitourinary:negative for dysuria, frequency, and hematuria  Hematologic/lymphatic: negative for bleeding, easy bruising, and lymphadenopathy  Musculoskeletal:negative for arthralgias, back pain, and myalgias  Neurological: negative for gait problems, paresthesia, speech problems, vertigo, and weakness  Behavioral/Psych: negative for excessive alcohol consumption, illegal drug usage, and sleep disturbance    -------------------------------------    Anticoagulant long-term use    Coronary artery disease    1 coronary stent    DJD (degenerative joint disease) of knee    Encounter for blood transfusion    post op    GERD (gastroesophageal reflux disease)    Hyperlipidemia    Hypertension    Hypothyroidism    Lumbar disc disease    PAD (peripheral artery disease)    Right rotator cuff tear arthropathy     ----------------------------    Appendectomy    Cardiac surgery    stent x 10yrs    Cataracts    Celiac artery stent    Colonoscopy    GUARISCO.   Small internal hemorrhoids, otherwise normal colon.  Repeat 10 yrs.    Colonoscopy    Procedure: COLONOSCOPY;  Surgeon: Alvarado Lee Jr., MD;  Location: [facility];  Service: Endoscopy;  Laterality: N/A;    Coronary angioplasty with stent placement    1 stent    Eboni    Esophagogastroduodenoscopy    Esophagogastroduodenoscopy    Procedure: EGD (ESOPHAGOGASTRODUODENOSCOPY);  Surgeon: Alvarado Lee Jr., MD;  Location: [facility];  Service: Endoscopy;  Laterality: N/A;    Reverse total shoulder arthroplasty    Procedure: ARTHROPLASTY, SHOULDER, TOTAL, REVERSE -  "LATISSIMUS DORSI TRANSFER;  Surgeon: Deni Jasso II, MD;  Location: [facility] OR;  Service: Orthopedics;  Laterality: Right;    Total knee arthroplasty    Tubal ligation    Upper gastrointestinal endoscopy    Dr. Lee; esophageal stenosis, dilated; gastritis; bx negative        Family History   Problem Relation Name Age of Onset    Hypertension Mother      Osteoporosis Mother      Heart disease Father      Heart attack Father      Heart disease Sister      Diabetes Sister      Clotting disorder Maternal Uncle      Stroke Maternal Grandmother      Heart disease Brother      Diabetes Brother      Alzheimer's disease Maternal Aunt      Colon cancer Neg Hx       Social History     Tobacco Use    Smoking status: Never    Smokeless tobacco: Never   Substance Use Topics    Alcohol use: No    Drug use: No       Physical Exam  BP (!) 155/79 (BP Location: Left arm, Patient Position: Sitting)   Pulse 68   Ht 5' 4" (1.626 m)   Wt 76.8 kg (169 lb 5 oz)   BMI 29.06 kg/m²   Body surface area is 1.86 meters squared.  Body mass index is 29.06 kg/m².    General appearance: alert, appears stated age, cooperative, and no distress  Head: Normocephalic, without obvious abnormality, atraumatic  Neck: no carotid bruit, no JVD, and supple, symmetrical, trachea midline  Lungs: clear to auscultation bilaterally  Heart: regular rate and rhythm; S1, S2 normal, no murmur, click, rub or gallop  Abdomen: soft, non-tender, no distended  Extremities: extremities atraumatic, no pitting edema  Skin: warm, no cyanosis, no pathologic ecchymosis in exposed portions  Neurologic: Grossly normal. A&O x3      Lab Review   Lab Results   Component Value Date    WBC 9.68 10/26/2022    HGB 13.4 10/26/2022    HCT 41.4 10/26/2022    MCV 93 10/26/2022     10/26/2022         BMP  Lab Results   Component Value Date     07/31/2024    K 4.1 07/31/2024     07/31/2024    CO2 26 07/31/2024    BUN 21 (H) 07/31/2024    CREATININE 0.68 07/31/2024    " CALCIUM 9.2 07/31/2024    ANIONGAP 10 07/31/2024    ESTGFRAFRICA >60 04/29/2022    EGFRNONAA >60 04/29/2022       Lab Results   Component Value Date    ALBUMIN 4.3 07/31/2024       Lab Results   Component Value Date    ALT 22 07/31/2024    AST 37 (H) 07/31/2024    ALKPHOS 93 07/31/2024    BILITOT 0.7 07/31/2024       Lab Results   Component Value Date    TSH 2.740 07/31/2024       Lab Results   Component Value Date    CHOL 172 01/24/2024    CHOL 194 08/29/2023    CHOL 175 10/05/2022     Lab Results   Component Value Date    HDL 86 (H) 01/24/2024    HDL 87 (H) 08/29/2023    HDL 66 10/05/2022     Lab Results   Component Value Date    LDLCALC 69.8 01/24/2024    LDLCALC 88.0 08/29/2023    LDLCALC 86.6 10/05/2022     Lab Results   Component Value Date    TRIG 81 01/24/2024    TRIG 95 08/29/2023    TRIG 112 10/05/2022     Lab Results   Component Value Date    CHOLHDL 50.0 01/24/2024    CHOLHDL 44.8 08/29/2023    CHOLHDL 37.7 10/05/2022        EKG 12/16/24: NSR 64 bpm, , QRS 88, no ST changes        Assessment & Plan:     This is a 79 y.o. female with CAD, carotid artery disease, aortic atherosclerosis, HTN, HLD. No active cardiac complaints. Requests cardiac clearance for L hip replacement surgery on 1/13/25.     1. Preop cardiovascular exam (Primary)  - EKG today stable   - Patient denies anginal equivalents   - Can hold ASA for no more than 5-7 days prior to procedure. Restart postoperatively as soon as patient able to tolerate.   - With the above recommendations, the patient is optimized for the above mentioned procedure.     2. Coronary artery disease   - No anginal equivalents   - Continue ASA 81 mg qd   - Continue atorvastatin 80 mg qd    3. Bilateral carotid artery disease  - Asymptomatic   - Continue ASA 81 mg qd   - Continue atorvastatin 80 mg qd    4. Aortic atherosclerosis  - Stable   - Continue ASA 81 mg qd   - Continue atorvastatin 80 mg qd    5. Primary hypertension  - Well controlled   - Continue  metoprolol tartrate 50 mg qd     6. Mixed hyperlipidemia  - LDL 69.8, HDL 86  - Continue ASA 81 mg qd   - Continue atorvastatin 80 mg qd    Emphasized the importance of modifying lifestyle related risk factors including exercise, diet most resembling a Mediterranean diet.    Please follow up as needed.      [facility]EE[facility] [place]  Office: [phone]

## 2024-12-16 NOTE — TELEPHONE ENCOUNTER
Faxed office note containing clearance and EKG from 12/16 to [phone].   Dr. Esquivel- [facility]

## 2024-12-18 ENCOUNTER — TELEPHONE (OUTPATIENT)
Dept: CARDIOLOGY | Facility: CLINIC | Age: 79
End: 2024-12-18
Payer: MEDICARE

## 2024-12-18 NOTE — TELEPHONE ENCOUNTER
Cleopatra  Left total hip arthroplasty with LIANNE  Aspirin   1/13/25  [facility]   Phone:[phone]  Fax:[phone]

## 2025-01-22 DIAGNOSIS — I25.10 CORONARY ARTERY DISEASE INVOLVING NATIVE CORONARY ARTERY OF NATIVE HEART WITHOUT ANGINA PECTORIS: Chronic | ICD-10-CM

## 2025-01-23 RX ORDER — METOPROLOL TARTRATE 50 MG/1
TABLET ORAL
Qty: 90 TABLET | Refills: 3 | Status: SHIPPED | OUTPATIENT
Start: 2025-01-23

## (undated) DEVICE — GLOVE SURGICAL LATEX SZ 7

## (undated) DEVICE — SPONGE DERMA 8PLY 2X2

## (undated) DEVICE — TRAY NERVE BLOCK

## (undated) DEVICE — NDL SPINAL SPINOCAN 22GX3.5

## (undated) DEVICE — MARKER SKIN STND TIP BLUE BARR

## (undated) DEVICE — APPLICATOR CHLORAPREP CLR 10.5

## (undated) DEVICE — SEE MEDLINE ITEM 152622